# Patient Record
Sex: FEMALE | Race: WHITE | Employment: PART TIME | URBAN - METROPOLITAN AREA
[De-identification: names, ages, dates, MRNs, and addresses within clinical notes are randomized per-mention and may not be internally consistent; named-entity substitution may affect disease eponyms.]

---

## 2018-12-03 ENCOUNTER — HOSPITAL ENCOUNTER (EMERGENCY)
Facility: CLINIC | Age: 48
Discharge: HOME | End: 2018-12-03
Attending: PHYSICIAN ASSISTANT | Admitting: PHYSICIAN ASSISTANT
Payer: COMMERCIAL

## 2018-12-03 VITALS
OXYGEN SATURATION: 99 % | HEART RATE: 70 BPM | RESPIRATION RATE: 20 BRPM | WEIGHT: 150 LBS | DIASTOLIC BLOOD PRESSURE: 77 MMHG | TEMPERATURE: 98.2 F | SYSTOLIC BLOOD PRESSURE: 116 MMHG
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
## 2018-12-03 DIAGNOSIS — T18.128A ESOPHAGEAL OBSTRUCTION DUE TO FOOD IMPACTION: ICD-10-CM

## 2018-12-03 PROCEDURE — 96375 TX/PRO/DX INJ NEW DRUG ADDON: CPT

## 2018-12-03 PROCEDURE — 96374 THER/PROPH/DIAG INJ IV PUSH: CPT

## 2018-12-03 PROCEDURE — 25000128 H RX IP 250 OP 636: Performed by: PHYSICIAN ASSISTANT

## 2018-12-03 PROCEDURE — 99285 EMERGENCY DEPT VISIT HI MDM: CPT | Mod: 25

## 2018-12-03 PROCEDURE — 25500045 ZZH RX 255: Performed by: PHYSICIAN ASSISTANT

## 2018-12-03 RX ORDER — DIAZEPAM 10 MG/2ML
1 INJECTION, SOLUTION INTRAMUSCULAR; INTRAVENOUS ONCE
Status: COMPLETED | OUTPATIENT
Start: 2018-12-03 | End: 2018-12-03

## 2018-12-03 RX ORDER — DIAZEPAM 10 MG/2ML
1 INJECTION, SOLUTION INTRAMUSCULAR; INTRAVENOUS EVERY 4 HOURS PRN
Status: DISCONTINUED | OUTPATIENT
Start: 2018-12-03 | End: 2018-12-03

## 2018-12-03 RX ADMIN — GLUCAGON HYDROCHLORIDE 1 MG: KIT at 10:59

## 2018-12-03 RX ADMIN — Medication 1 MG: at 10:59

## 2018-12-03 RX ADMIN — ANTACID/ANTIFLATULENT 4 G: 380; 550; 10; 10 GRANULE, EFFERVESCENT ORAL at 11:11

## 2018-12-03 ASSESSMENT — ENCOUNTER SYMPTOMS
VOMITING: 0
VOICE CHANGE: 0
FEVER: 0
TROUBLE SWALLOWING: 1
CHILLS: 0
SHORTNESS OF BREATH: 0
NAUSEA: 0

## 2018-12-03 NOTE — ED AVS SNAPSHOT
[facility]    [address]    [facility] [address]    Phone:  [phone]    Fax:  [phone]                                       Julia Jefferson   MRN: [id number]    Department:  [facility]   Date of Visit:  12/3/2018           Patient Information     Date Of Birth          1970        Your diagnoses for this visit were:     Esophageal obstruction due to food impaction Resolved       You were seen by Stephanie Cummins PA-C.      Follow-up Information     Schedule an appointment as soon as possible for a visit with Primary Care Provider.        Follow up with [facility].    Specialty:  EMERGENCY MEDICINE    Why:  As needed if symptoms recur    Contact information:    [address]  [facility] [address] [phone]        Discharge Instructions         Follow up with your primary care provider as soon as possible to discuss scheduling an outpatient upper endoscopy.     Esophageal Blockage, Resolved  The esophagus is the passage that carries food from the mouth to the stomach. You had a blockage in the esophagus. This can happen after swallowing a large piece of food, taking a large pill, or swallowing foreign objects.  If this is a recurring problem, it can be a sign of disease in the esophagus, such as inflammation (swelling and irritation) or scarring. If you did not have a special procedure (endoscopy) today to treat your condition, further testing will be needed to evaluate this problem.  The blockage has cleared. You should be able to swallow normally again.  Home care    For the next 24 hours you may drink liquids and eat soft foods.    You may have been given medicine today to prevent pain and help you relax. If so, you may feel drowsy for the next 4 to 12 hours. Do not drive or operate dangerous equipment until you feel alert again.    If your esophagus was blocked  by food, be sure to cut solid food into small pieces before putting it into your mouth. Chew all foods well before swallowing.    If your esophagus was blocked by an over-the-counter pill (such as a vitamin), avoid this size pill in the future. If it was blocked by a prescription medicine, ask your healthcare provider for another form of medicine.  Follow-up care  Follow up with your healthcare provider, or as advised. If you continue to have problems, contact your doctor or this facility for advice. If this is a recurring problem, talk with your healthcare provider about it. He or she may suggest having an endoscopy. This is a look in the esophagus with a small camera and light in a narrow, flexible tube.  When to seek medical advice  Call your healthcare provider right away if any of these occur:    Unable to swallow    Significant pain on swallowing    Fever of 100.4 F (38 C) or higher, or as directed by your healthcare provider  Call 911  Call 911 if any of the following occur:     Chest pain or shortness of breath    Vomiting blood (red or black)    Blood in your stool (dark red or black color)   Date Last Reviewed: 5/1/2017 2000-2018 [employer]. [address] [place] [address]. All rights reserved. This information is not intended as a substitute for professional medical care. Always follow your healthcare professional's instructions.          24 Hour Appointment Hotline       To make an appointment at any [facility], call [phone] ([phone]). If you don't have a family doctor or clinic, we will help you find one. [facility] clinics are conveniently located to serve the needs of you and your family.             Review of your medicines      Notice     You have not been prescribed any medications.            Orders Needing Specimen Collection     None      Pending Results     No orders found from 12/1/2018 to 12/4/2018.            Pending Culture Results     No orders  found from 12/1/2018 to 12/4/2018.            Pending Results Instructions     If you had any lab results that were not finalized at the time of your Discharge, you can call the ED Lab Result RN at [phone]. You will be contacted by this team for any positive Lab results or changes in treatment. The nurses are available 7 days a week from 10A to 6:30P.  You can leave a message 24 hours per day and they will return your call.        Test Results From Your Hospital Stay               Clinical Quality Measure: Blood Pressure Screening     Your blood pressure was checked while you were in the emergency department today. The last reading we obtained was  BP: 132/79 . Please read the guidelines below about what these numbers mean and what you should do about them.  If your systolic blood pressure (the top number) is less than 120 and your diastolic blood pressure (the bottom number) is less than 80, then your blood pressure is normal. There is nothing more that you need to do about it.  If your systolic blood pressure (the top number) is 120-139 or your diastolic blood pressure (the bottom number) is 80-89, your blood pressure may be higher than it should be. You should have your blood pressure rechecked within a year by a primary care provider.  If your systolic blood pressure (the top number) is 140 or greater or your diastolic blood pressure (the bottom number) is 90 or greater, you may have high blood pressure. High blood pressure is treatable, but if left untreated over time it can put you at risk for heart attack, stroke, or kidney failure. You should have your blood pressure rechecked by a primary care provider within the next 4 weeks.  If your provider in the emergency department today gave you specific instructions to follow-up with your doctor or provider even sooner than that, you should follow that instruction and not wait for up to 4 weeks for your follow-up visit.        Thank you for choosing [facility]   "     Thank you for choosing [facility] for your care. Our goal is always to provide you with excellent care. Hearing back from our patients is one way we can continue to improve our services. Please take a few minutes to complete the written survey that you may receive in the mail after you visit with us. Thank you!        [facility] Information     [facility] lets you send messages to your doctor, view your test results, renew your prescriptions, schedule appointments and more. To sign up, go to [email or web address] . Click on \"Log in\" on the left side of the screen, which will take you to the Welcome page. Then click on \"Sign up Now\" on the right side of the page.     You will be asked to enter the access code listed below, as well as some personal information. Please follow the directions to create your username and password.     Your access code is: [email or web address]  Expires: 3/3/2019 11:52 AM     Your access code will  in 90 days. If you need help or a new code, please call your [facility] clinic or [phone].        Care EveryWhere ID     This is your Care EveryWhere ID. This could be used by other organizations to access your [facility] medical records  [id number]        Equal Access to Services     CAPO GUARDADO : Ana centenoo Sojose cruz, waaxda luqadaha, qaybta kaalmada adebrooklynnyada, tod jimenes. So [facility] [phone].    ATENCIÓN: Si habla español, tiene a cruz disposición servicios gratuitos de asistencia lingüística. Llame al [phone].    We comply with applicable federal civil rights laws and [place] laws. We do not discriminate on the basis of race, color, national origin, age, disability, sex, sexual orientation, or gender identity.            After Visit Summary       This is your record. Keep this with you and show to your community pharmacist(s) and doctor(s) at your next visit.                  "

## 2018-12-03 NOTE — ED AVS SNAPSHOT
[facility]    [address]    [facility] [address]    Phone:  [phone]    Fax:  [phone]                                       Julia Jefferson   MRN: [id number]    Department:  [facility]   Date of Visit:  12/3/2018           After Visit Summary Signature Page     I have received my discharge instructions, and my questions have been answered. I have discussed any challenges I see with this plan with the nurse or doctor.    ..........................................................................................................................................  Patient/Patient Representative Signature      ..........................................................................................................................................  Patient Representative Print Name and Relationship to Patient    ..................................................               ................................................  Date                                   Time    ..........................................................................................................................................  Reviewed by Signature/Title    ...................................................              ..............................................  Date                                               Time          22EPIC Rev 08/18

## 2018-12-03 NOTE — ED TRIAGE NOTES
Pt has had a piece of chicken stuck in throat since last night. Difficulty swallowing spit. Has had in past, but passed on its own. Has never needed a scope. Alert and oriented. ABC intact.

## 2018-12-03 NOTE — DISCHARGE INSTRUCTIONS
Follow up with your primary care provider as soon as possible to discuss scheduling an outpatient upper endoscopy.     Esophageal Blockage, Resolved  The esophagus is the passage that carries food from the mouth to the stomach. You had a blockage in the esophagus. This can happen after swallowing a large piece of food, taking a large pill, or swallowing foreign objects.  If this is a recurring problem, it can be a sign of disease in the esophagus, such as inflammation (swelling and irritation) or scarring. If you did not have a special procedure (endoscopy) today to treat your condition, further testing will be needed to evaluate this problem.  The blockage has cleared. You should be able to swallow normally again.  Home care    For the next 24 hours you may drink liquids and eat soft foods.    You may have been given medicine today to prevent pain and help you relax. If so, you may feel drowsy for the next 4 to 12 hours. Do not drive or operate dangerous equipment until you feel alert again.    If your esophagus was blocked by food, be sure to cut solid food into small pieces before putting it into your mouth. Chew all foods well before swallowing.    If your esophagus was blocked by an over-the-counter pill (such as a vitamin), avoid this size pill in the future. If it was blocked by a prescription medicine, ask your healthcare provider for another form of medicine.  Follow-up care  Follow up with your healthcare provider, or as advised. If you continue to have problems, contact your doctor or this facility for advice. If this is a recurring problem, talk with your healthcare provider about it. He or she may suggest having an endoscopy. This is a look in the esophagus with a small camera and light in a narrow, flexible tube.  When to seek medical advice  Call your healthcare provider right away if any of these occur:    Unable to swallow    Significant pain on swallowing    Fever of 100.4 F (38 C) or higher, or  as directed by your healthcare provider  Call 911  Call 911 if any of the following occur:     Chest pain or shortness of breath    Vomiting blood (red or black)    Blood in your stool (dark red or black color)   Date Last Reviewed: 5/1/2017 2000-2018 [employer]. [address] [place] [address]. All rights reserved. This information is not intended as a substitute for professional medical care. Always follow your healthcare professional's instructions.

## 2018-12-03 NOTE — ED PROVIDER NOTES
History     Chief Complaint:  Foreign Body in Throat    HPI   Julia Jefferson is a previously healthy 48 year old female who presents to the emergency department for evaluation of foreign body in her throat. She reports that she has felt like a piece of chicken is stuck in her throat since last night. She reports a history of this, but in the past, she was always able to pass the boluses on her own using water. She reports she has tried to both drink sips of water and chugging liquid, neither of which have worked. States she just vomits/regurgitates it back up a few minutes after drinking it. She reports that she cannot swallow even her saliva. She denies any history of endoscopy. She also denies any associated chills, fever, difficulty breathing, or other symptoms.     Allergies:  NKDA    Medications:    The patient is currently on no regular medications.    Past Medical History:    The patient denies any significant past medical history.    Past Surgical History:    The patient does not have any pertinent past surgical history.    Family History:    No past pertinent family history.    Social History:  Marital Status:   [2]  Negative for alcohol use.  Negative for tobacco use.     Review of Systems   Constitutional: Negative for chills and fever.   HENT: Positive for trouble swallowing. Negative for voice change.    Respiratory: Negative for shortness of breath.    Gastrointestinal: Negative for nausea and vomiting.   All other systems reviewed and are negative.      Physical Exam     Patient Vitals for the past 24 hrs:   BP Temp Temp src Pulse Resp SpO2 Weight   12/03/18 1041 - - - - - - 68 kg (150 lb)   12/03/18 1036 132/79 98.2  F (36.8  C) Oral 70 20 99 % -     Physical Exam  Constitutional: well appearing, sitting comfortably on bed.   Head: No external signs of trauma noted to head or face.   Eyes:  Conjunctiva normal.  ENT: MMM. Oropharynx without tonsillar enlargement or exudate. Normal voice.    Neck: Normal ROM.   Cardiovascular: Normal rate, regular rhythm, and intact distal pulses.    Respiratory: Effort normal. No respiratory distress. Lungs clear to auscultation bilaterally. No stridor.   GI: Soft. There is no tenderness. There is no rebound.   Musculoskeletal: No deformities appreciated. Normal ROM. No edema noted.  Neurological: Alert and Oriented x 3. Speech normal. Moves all extremities equally.   Psychiatric: Appropriate mood, affect, and behavior.   Skin: Skin is warm and dry.       Emergency Department Course   Interventions:  1059 Glucagon, 1 mg, IV injection   Diazepam, 1 mg, IV injection  1111 EZ Gas, 4 g, PO    Emergency Department Course:  Nursing notes and vitals reviewed. (1037) I performed an exam of the patient as documented above.      IV inserted. Medicine administered as documented above.      (1141) I rechecked the patient and discussed the results of her workup thus far. She was able to tolerate the EZ gas and the bolus has gone down. She continued to drink the soda with ease.      Findings and plan explained to the Patient. Patient discharged home with instructions regarding supportive care, medications, and reasons to return. The importance of close follow-up was reviewed.      I personally answered all related questions prior to discharge.      Impression & Plan      Medical Decision Makin year old female presenting with chicken stuck in her esophagus and unable to swallow. She reports similar episodes in the past that she has been able to resolve on her own by drinking a large amount of fluid, but this has failed this time. She was given EZ gas, glucagon, and valium in the ED which she tolerated well and afterwards reports the food bolus had passed down and she was able to tolerate PO without difficulty. She was observed briefly afterward and had no vomiting and was able to continue to tolerate PO. I discussed with her that the cause of this could be an esophageal  stricture, esophageal mass, or other esophageal disorder and that she requires outpatient follow up with GI for upper endoscopy. I offered to place an outpatient order for the procedure, but she declined and wished to follow-up with and get a referral from her PCP. She will follow-up with PCP as soon as possible. Instructed to return to the ED if symptoms returned.     Diagnosis:    ICD-10-CM    1. Esophageal obstruction due to food impaction K22.2     T18.128A     Resolved       Disposition:  discharged to home    Scribe Disclosure:  I, Jennifer Alves, am serving as a scribe on 12/3/2018 at 10:37 AM to personally document services performed by Stephanie Cummins PA-C based on my observations and the provider's statements to me.     Jennifer Alves  12/3/2018   [facility] EMERGENCY DEPARTMENT       Stephanie Cummins PA-C  12/03/18 1226

## 2021-09-09 ENCOUNTER — TELEPHONE (OUTPATIENT)
Dept: FAMILY MEDICINE | Facility: CLINIC | Age: 51
End: 2021-09-09

## 2021-09-09 NOTE — TELEPHONE ENCOUNTER
Reason for Call:  Form, our goal is to have forms completed with 72 hours, however, some forms may require a visit or additional information.    Type of letter, form or note:  FMLA    Who is the form from?: Patient    Where did the form come from: Patient or family brought in       What clinic location was the form placed at?: [facility]     Where the form was placed: Dr Ruiz  Box/Folder    What number is listed as a contact on the form?: none        Additional comments: patient dropped off forms for provider to fill out, due to having to watch and take care of her mother Emilie Webster, who sees Dr Ruiz. Patient herself has never seen provider here at [facility], to put this under    -not sure if patient needs to schedule appointment to discuss this or not, or if the mother who she will be taking care of needs an appointment     Please follow up with either if one is needed, otherwise paperwork is in [place] folder for time being.     Call taken on 9/9/2021 at 2:04 PM by Yasmine rGay

## 2025-03-23 ENCOUNTER — HOSPITAL ENCOUNTER (INPATIENT)
Facility: CLINIC | Age: 55
LOS: 1 days | Discharge: HOME | End: 2025-03-24
Attending: EMERGENCY MEDICINE | Admitting: INTERNAL MEDICINE
Payer: COMMERCIAL

## 2025-03-23 DIAGNOSIS — K80.00 CALCULUS OF GALLBLADDER WITH ACUTE CHOLECYSTITIS WITHOUT OBSTRUCTION: ICD-10-CM

## 2025-03-23 DIAGNOSIS — I72.8 SPLENIC ARTERY ANEURYSM: ICD-10-CM

## 2025-03-23 LAB
BASOPHILS # BLD AUTO: 0.1 10E3/UL (ref 0–0.2)
BASOPHILS NFR BLD AUTO: 0 %
EOSINOPHIL # BLD AUTO: 0.2 10E3/UL (ref 0–0.7)
EOSINOPHIL NFR BLD AUTO: 2 %
ERYTHROCYTE [DISTWIDTH] IN BLOOD BY AUTOMATED COUNT: 13.3 % (ref 10–15)
HCT VFR BLD AUTO: 36.8 % (ref 35–47)
HGB BLD-MCNC: 12.7 G/DL (ref 11.7–15.7)
IMM GRANULOCYTES # BLD: 0.1 10E3/UL
IMM GRANULOCYTES NFR BLD: 1 %
LYMPHOCYTES # BLD AUTO: 1 10E3/UL (ref 0.8–5.3)
LYMPHOCYTES NFR BLD AUTO: 9 %
MCH RBC QN AUTO: 30.8 PG (ref 26.5–33)
MCHC RBC AUTO-ENTMCNC: 34.5 G/DL (ref 31.5–36.5)
MCV RBC AUTO: 89 FL (ref 78–100)
MONOCYTES # BLD AUTO: 0.3 10E3/UL (ref 0–1.3)
MONOCYTES NFR BLD AUTO: 3 %
NEUTROPHILS # BLD AUTO: 9.6 10E3/UL (ref 1.6–8.3)
NEUTROPHILS NFR BLD AUTO: 85 %
NRBC # BLD AUTO: 0 10E3/UL
NRBC BLD AUTO-RTO: 0 /100
PLATELET # BLD AUTO: 224 10E3/UL (ref 150–450)
RBC # BLD AUTO: 4.13 10E6/UL (ref 3.8–5.2)
WBC # BLD AUTO: 11.3 10E3/UL (ref 4–11)

## 2025-03-23 PROCEDURE — 36415 COLL VENOUS BLD VENIPUNCTURE: CPT | Performed by: EMERGENCY MEDICINE

## 2025-03-23 PROCEDURE — 80053 COMPREHEN METABOLIC PANEL: CPT | Performed by: EMERGENCY MEDICINE

## 2025-03-23 PROCEDURE — 99285 EMERGENCY DEPT VISIT HI MDM: CPT | Mod: 25

## 2025-03-23 PROCEDURE — 83690 ASSAY OF LIPASE: CPT | Performed by: EMERGENCY MEDICINE

## 2025-03-23 PROCEDURE — 93005 ELECTROCARDIOGRAM TRACING: CPT

## 2025-03-23 PROCEDURE — 83036 HEMOGLOBIN GLYCOSYLATED A1C: CPT | Performed by: INTERNAL MEDICINE

## 2025-03-23 PROCEDURE — 85025 COMPLETE CBC W/AUTO DIFF WBC: CPT | Performed by: EMERGENCY MEDICINE

## 2025-03-23 RX ORDER — ONDANSETRON 2 MG/ML
4 INJECTION INTRAMUSCULAR; INTRAVENOUS EVERY 30 MIN PRN
Status: DISCONTINUED | OUTPATIENT
Start: 2025-03-23 | End: 2025-03-24

## 2025-03-23 RX ORDER — HYDROMORPHONE HYDROCHLORIDE 1 MG/ML
0.5 INJECTION, SOLUTION INTRAMUSCULAR; INTRAVENOUS; SUBCUTANEOUS EVERY 30 MIN PRN
Status: COMPLETED | OUTPATIENT
Start: 2025-03-23 | End: 2025-03-24

## 2025-03-23 ASSESSMENT — COLUMBIA-SUICIDE SEVERITY RATING SCALE - C-SSRS
2. HAVE YOU ACTUALLY HAD ANY THOUGHTS OF KILLING YOURSELF IN THE PAST MONTH?: NO
1. IN THE PAST MONTH, HAVE YOU WISHED YOU WERE DEAD OR WISHED YOU COULD GO TO SLEEP AND NOT WAKE UP?: NO
6. HAVE YOU EVER DONE ANYTHING, STARTED TO DO ANYTHING, OR PREPARED TO DO ANYTHING TO END YOUR LIFE?: NO

## 2025-03-24 ENCOUNTER — ANESTHESIA EVENT (OUTPATIENT)
Dept: SURGERY | Facility: CLINIC | Age: 55
End: 2025-03-24
Payer: COMMERCIAL

## 2025-03-24 ENCOUNTER — TELEPHONE (OUTPATIENT)
Dept: OTHER | Facility: CLINIC | Age: 55
End: 2025-03-24

## 2025-03-24 ENCOUNTER — APPOINTMENT (OUTPATIENT)
Dept: CT IMAGING | Facility: CLINIC | Age: 55
End: 2025-03-24
Attending: EMERGENCY MEDICINE
Payer: COMMERCIAL

## 2025-03-24 ENCOUNTER — ANESTHESIA (OUTPATIENT)
Dept: SURGERY | Facility: CLINIC | Age: 55
End: 2025-03-24
Payer: COMMERCIAL

## 2025-03-24 ENCOUNTER — APPOINTMENT (OUTPATIENT)
Dept: ULTRASOUND IMAGING | Facility: CLINIC | Age: 55
End: 2025-03-24
Attending: EMERGENCY MEDICINE
Payer: COMMERCIAL

## 2025-03-24 VITALS
WEIGHT: 134.9 LBS | RESPIRATION RATE: 12 BRPM | DIASTOLIC BLOOD PRESSURE: 77 MMHG | HEIGHT: 66 IN | OXYGEN SATURATION: 93 % | TEMPERATURE: 99.3 F | BODY MASS INDEX: 21.68 KG/M2 | SYSTOLIC BLOOD PRESSURE: 111 MMHG | HEART RATE: 79 BPM

## 2025-03-24 PROBLEM — K80.00 CALCULUS OF GALLBLADDER WITH ACUTE CHOLECYSTITIS WITHOUT OBSTRUCTION: Status: ACTIVE | Noted: 2025-03-24

## 2025-03-24 PROBLEM — I72.8 SPLENIC ARTERY ANEURYSM: Status: ACTIVE | Noted: 2025-03-24

## 2025-03-24 LAB
ALBUMIN SERPL BCG-MCNC: 4.1 G/DL (ref 3.5–5.2)
ALBUMIN SERPL BCG-MCNC: 4.6 G/DL (ref 3.5–5.2)
ALBUMIN UR-MCNC: NEGATIVE MG/DL
ALP SERPL-CCNC: 69 U/L (ref 40–150)
ALP SERPL-CCNC: 76 U/L (ref 40–150)
ALT SERPL W P-5'-P-CCNC: 19 U/L (ref 0–50)
ALT SERPL W P-5'-P-CCNC: 20 U/L (ref 0–50)
ANION GAP SERPL CALCULATED.3IONS-SCNC: 11 MMOL/L (ref 7–15)
ANION GAP SERPL CALCULATED.3IONS-SCNC: 12 MMOL/L (ref 7–15)
APPEARANCE UR: CLEAR
AST SERPL W P-5'-P-CCNC: 17 U/L (ref 0–45)
AST SERPL W P-5'-P-CCNC: 18 U/L (ref 0–45)
ATRIAL RATE - MUSE: 67 BPM
BILIRUB SERPL-MCNC: 0.8 MG/DL
BILIRUB SERPL-MCNC: 0.9 MG/DL
BILIRUB UR QL STRIP: NEGATIVE
BUN SERPL-MCNC: 11.3 MG/DL (ref 6–20)
BUN SERPL-MCNC: 9 MG/DL (ref 6–20)
CALCIUM SERPL-MCNC: 10.5 MG/DL (ref 8.8–10.4)
CALCIUM SERPL-MCNC: 9.3 MG/DL (ref 8.8–10.4)
CHLORIDE SERPL-SCNC: 101 MMOL/L (ref 98–107)
CHLORIDE SERPL-SCNC: 106 MMOL/L (ref 98–107)
COLOR UR AUTO: ABNORMAL
CREAT SERPL-MCNC: 0.67 MG/DL (ref 0.51–0.95)
CREAT SERPL-MCNC: 0.86 MG/DL (ref 0.51–0.95)
DIASTOLIC BLOOD PRESSURE - MUSE: NORMAL MMHG
EGFRCR SERPLBLD CKD-EPI 2021: 79 ML/MIN/1.73M2
EGFRCR SERPLBLD CKD-EPI 2021: >90 ML/MIN/1.73M2
ERYTHROCYTE [DISTWIDTH] IN BLOOD BY AUTOMATED COUNT: 13.3 % (ref 10–15)
EST. AVERAGE GLUCOSE BLD GHB EST-MCNC: 88 MG/DL
GLUCOSE BLDC GLUCOMTR-MCNC: 131 MG/DL (ref 70–99)
GLUCOSE BLDC GLUCOMTR-MCNC: 161 MG/DL (ref 70–99)
GLUCOSE SERPL-MCNC: 138 MG/DL (ref 70–99)
GLUCOSE SERPL-MCNC: 168 MG/DL (ref 70–99)
GLUCOSE UR STRIP-MCNC: NEGATIVE MG/DL
HBA1C MFR BLD: 4.7 %
HCG UR QL: NEGATIVE
HCO3 SERPL-SCNC: 26 MMOL/L (ref 22–29)
HCO3 SERPL-SCNC: 27 MMOL/L (ref 22–29)
HCT VFR BLD AUTO: 34.6 % (ref 35–47)
HGB BLD-MCNC: 11.8 G/DL (ref 11.7–15.7)
HGB UR QL STRIP: ABNORMAL
HOLD SPECIMEN: NORMAL
HOLD SPECIMEN: NORMAL
INR PPP: 0.99 (ref 0.85–1.15)
INTERPRETATION ECG - MUSE: NORMAL
KETONES UR STRIP-MCNC: NEGATIVE MG/DL
LEUKOCYTE ESTERASE UR QL STRIP: ABNORMAL
LIPASE SERPL-CCNC: 22 U/L (ref 13–60)
LIPASE SERPL-CCNC: 29 U/L (ref 13–60)
MCH RBC QN AUTO: 30.7 PG (ref 26.5–33)
MCHC RBC AUTO-ENTMCNC: 34.1 G/DL (ref 31.5–36.5)
MCV RBC AUTO: 90 FL (ref 78–100)
MUCOUS THREADS #/AREA URNS LPF: PRESENT /LPF
NITRATE UR QL: NEGATIVE
P AXIS - MUSE: 20 DEGREES
PH UR STRIP: 5 [PH] (ref 5–7)
PLATELET # BLD AUTO: 220 10E3/UL (ref 150–450)
POTASSIUM SERPL-SCNC: 3.7 MMOL/L (ref 3.4–5.3)
POTASSIUM SERPL-SCNC: 3.8 MMOL/L (ref 3.4–5.3)
PR INTERVAL - MUSE: 178 MS
PROT SERPL-MCNC: 6.8 G/DL (ref 6.4–8.3)
PROT SERPL-MCNC: 7.4 G/DL (ref 6.4–8.3)
QRS DURATION - MUSE: 84 MS
QT - MUSE: 414 MS
QTC - MUSE: 437 MS
R AXIS - MUSE: 23 DEGREES
RBC # BLD AUTO: 3.84 10E6/UL (ref 3.8–5.2)
RBC URINE: 1 /HPF
SODIUM SERPL-SCNC: 139 MMOL/L (ref 135–145)
SODIUM SERPL-SCNC: 144 MMOL/L (ref 135–145)
SP GR UR STRIP: 1.02 (ref 1–1.03)
SQUAMOUS EPITHELIAL: <1 /HPF
SYSTOLIC BLOOD PRESSURE - MUSE: NORMAL MMHG
T AXIS - MUSE: 43 DEGREES
UROBILINOGEN UR STRIP-MCNC: NORMAL MG/DL
VENTRICULAR RATE- MUSE: 67 BPM
WBC # BLD AUTO: 10.8 10E3/UL (ref 4–11)
WBC URINE: 1 /HPF

## 2025-03-24 PROCEDURE — 250N000009 HC RX 250: Performed by: STUDENT IN AN ORGANIZED HEALTH CARE EDUCATION/TRAINING PROGRAM

## 2025-03-24 PROCEDURE — 83690 ASSAY OF LIPASE: CPT | Performed by: INTERNAL MEDICINE

## 2025-03-24 PROCEDURE — 120N000001 HC R&B MED SURG/OB

## 2025-03-24 PROCEDURE — 96374 THER/PROPH/DIAG INJ IV PUSH: CPT | Mod: 59

## 2025-03-24 PROCEDURE — 250N000011 HC RX IP 250 OP 636: Performed by: STUDENT IN AN ORGANIZED HEALTH CARE EDUCATION/TRAINING PROGRAM

## 2025-03-24 PROCEDURE — 76705 ECHO EXAM OF ABDOMEN: CPT

## 2025-03-24 PROCEDURE — 258N000003 HC RX IP 258 OP 636: Performed by: ANESTHESIOLOGY

## 2025-03-24 PROCEDURE — 80053 COMPREHEN METABOLIC PANEL: CPT | Performed by: INTERNAL MEDICINE

## 2025-03-24 PROCEDURE — 370N000017 HC ANESTHESIA TECHNICAL FEE, PER MIN: Performed by: STUDENT IN AN ORGANIZED HEALTH CARE EDUCATION/TRAINING PROGRAM

## 2025-03-24 PROCEDURE — 99222 1ST HOSP IP/OBS MODERATE 55: CPT | Mod: 57 | Performed by: STUDENT IN AN ORGANIZED HEALTH CARE EDUCATION/TRAINING PROGRAM

## 2025-03-24 PROCEDURE — 999N000141 HC STATISTIC PRE-PROCEDURE NURSING ASSESSMENT: Performed by: STUDENT IN AN ORGANIZED HEALTH CARE EDUCATION/TRAINING PROGRAM

## 2025-03-24 PROCEDURE — 96361 HYDRATE IV INFUSION ADD-ON: CPT

## 2025-03-24 PROCEDURE — 99207 PR APP CREDIT; MD BILLING SHARED VISIT: CPT | Performed by: PHYSICIAN ASSISTANT

## 2025-03-24 PROCEDURE — 82962 GLUCOSE BLOOD TEST: CPT

## 2025-03-24 PROCEDURE — 0DNU4ZZ RELEASE OMENTUM, PERCUTANEOUS ENDOSCOPIC APPROACH: ICD-10-PCS | Performed by: STUDENT IN AN ORGANIZED HEALTH CARE EDUCATION/TRAINING PROGRAM

## 2025-03-24 PROCEDURE — 96375 TX/PRO/DX INJ NEW DRUG ADDON: CPT

## 2025-03-24 PROCEDURE — 710N000009 HC RECOVERY PHASE 1, LEVEL 1, PER MIN: Performed by: STUDENT IN AN ORGANIZED HEALTH CARE EDUCATION/TRAINING PROGRAM

## 2025-03-24 PROCEDURE — 47562 LAPAROSCOPIC CHOLECYSTECTOMY: CPT | Mod: AS | Performed by: PHYSICIAN ASSISTANT

## 2025-03-24 PROCEDURE — 250N000025 HC SEVOFLURANE, PER MIN: Performed by: STUDENT IN AN ORGANIZED HEALTH CARE EDUCATION/TRAINING PROGRAM

## 2025-03-24 PROCEDURE — 258N000003 HC RX IP 258 OP 636: Performed by: NURSE ANESTHETIST, CERTIFIED REGISTERED

## 2025-03-24 PROCEDURE — 88304 TISSUE EXAM BY PATHOLOGIST: CPT | Mod: TC | Performed by: STUDENT IN AN ORGANIZED HEALTH CARE EDUCATION/TRAINING PROGRAM

## 2025-03-24 PROCEDURE — 250N000011 HC RX IP 250 OP 636: Performed by: EMERGENCY MEDICINE

## 2025-03-24 PROCEDURE — 96376 TX/PRO/DX INJ SAME DRUG ADON: CPT

## 2025-03-24 PROCEDURE — 36415 COLL VENOUS BLD VENIPUNCTURE: CPT | Performed by: INTERNAL MEDICINE

## 2025-03-24 PROCEDURE — 81001 URINALYSIS AUTO W/SCOPE: CPT | Performed by: EMERGENCY MEDICINE

## 2025-03-24 PROCEDURE — 0FT44ZZ RESECTION OF GALLBLADDER, PERCUTANEOUS ENDOSCOPIC APPROACH: ICD-10-PCS | Performed by: STUDENT IN AN ORGANIZED HEALTH CARE EDUCATION/TRAINING PROGRAM

## 2025-03-24 PROCEDURE — 99235 HOSP IP/OBS SAME DATE MOD 70: CPT | Performed by: INTERNAL MEDICINE

## 2025-03-24 PROCEDURE — 710N000012 HC RECOVERY PHASE 2, PER MINUTE: Performed by: STUDENT IN AN ORGANIZED HEALTH CARE EDUCATION/TRAINING PROGRAM

## 2025-03-24 PROCEDURE — 360N000076 HC SURGERY LEVEL 3, PER MIN: Performed by: STUDENT IN AN ORGANIZED HEALTH CARE EDUCATION/TRAINING PROGRAM

## 2025-03-24 PROCEDURE — 250N000011 HC RX IP 250 OP 636: Performed by: ANESTHESIOLOGY

## 2025-03-24 PROCEDURE — 258N000003 HC RX IP 258 OP 636: Performed by: INTERNAL MEDICINE

## 2025-03-24 PROCEDURE — 81025 URINE PREGNANCY TEST: CPT | Performed by: INTERNAL MEDICINE

## 2025-03-24 PROCEDURE — 250N000011 HC RX IP 250 OP 636: Performed by: INTERNAL MEDICINE

## 2025-03-24 PROCEDURE — 250N000011 HC RX IP 250 OP 636: Mod: JZ | Performed by: EMERGENCY MEDICINE

## 2025-03-24 PROCEDURE — 272N000001 HC OR GENERAL SUPPLY STERILE: Performed by: STUDENT IN AN ORGANIZED HEALTH CARE EDUCATION/TRAINING PROGRAM

## 2025-03-24 PROCEDURE — 85027 COMPLETE CBC AUTOMATED: CPT | Performed by: INTERNAL MEDICINE

## 2025-03-24 PROCEDURE — 258N000003 HC RX IP 258 OP 636: Performed by: EMERGENCY MEDICINE

## 2025-03-24 PROCEDURE — 74177 CT ABD & PELVIS W/CONTRAST: CPT

## 2025-03-24 PROCEDURE — 250N000009 HC RX 250: Performed by: NURSE ANESTHETIST, CERTIFIED REGISTERED

## 2025-03-24 PROCEDURE — 47562 LAPAROSCOPIC CHOLECYSTECTOMY: CPT | Performed by: STUDENT IN AN ORGANIZED HEALTH CARE EDUCATION/TRAINING PROGRAM

## 2025-03-24 PROCEDURE — 258N000001 HC RX 258: Performed by: STUDENT IN AN ORGANIZED HEALTH CARE EDUCATION/TRAINING PROGRAM

## 2025-03-24 PROCEDURE — 85610 PROTHROMBIN TIME: CPT | Performed by: INTERNAL MEDICINE

## 2025-03-24 PROCEDURE — 250N000011 HC RX IP 250 OP 636: Performed by: NURSE ANESTHETIST, CERTIFIED REGISTERED

## 2025-03-24 PROCEDURE — 250N000009 HC RX 250: Performed by: EMERGENCY MEDICINE

## 2025-03-24 RX ORDER — SODIUM CHLORIDE 9 MG/ML
INJECTION, SOLUTION INTRAVENOUS CONTINUOUS
Status: DISCONTINUED | OUTPATIENT
Start: 2025-03-24 | End: 2025-03-24 | Stop reason: HOSPADM

## 2025-03-24 RX ORDER — ONDANSETRON 2 MG/ML
INJECTION INTRAMUSCULAR; INTRAVENOUS PRN
Status: DISCONTINUED | OUTPATIENT
Start: 2025-03-24 | End: 2025-03-24

## 2025-03-24 RX ORDER — ONDANSETRON 4 MG/1
4 TABLET, ORALLY DISINTEGRATING ORAL EVERY 30 MIN PRN
Status: DISCONTINUED | OUTPATIENT
Start: 2025-03-24 | End: 2025-03-24 | Stop reason: HOSPADM

## 2025-03-24 RX ORDER — LABETALOL HYDROCHLORIDE 5 MG/ML
10 INJECTION, SOLUTION INTRAVENOUS
Status: DISCONTINUED | OUTPATIENT
Start: 2025-03-24 | End: 2025-03-24 | Stop reason: HOSPADM

## 2025-03-24 RX ORDER — DEXTROSE MONOHYDRATE 25 G/50ML
25-50 INJECTION, SOLUTION INTRAVENOUS
Status: DISCONTINUED | OUTPATIENT
Start: 2025-03-24 | End: 2025-03-24 | Stop reason: HOSPADM

## 2025-03-24 RX ORDER — NALOXONE HYDROCHLORIDE 0.4 MG/ML
0.2 INJECTION, SOLUTION INTRAMUSCULAR; INTRAVENOUS; SUBCUTANEOUS
Status: DISCONTINUED | OUTPATIENT
Start: 2025-03-24 | End: 2025-03-24 | Stop reason: HOSPADM

## 2025-03-24 RX ORDER — SODIUM CHLORIDE, SODIUM LACTATE, POTASSIUM CHLORIDE, CALCIUM CHLORIDE 600; 310; 30; 20 MG/100ML; MG/100ML; MG/100ML; MG/100ML
INJECTION, SOLUTION INTRAVENOUS CONTINUOUS
Status: DISCONTINUED | OUTPATIENT
Start: 2025-03-24 | End: 2025-03-24 | Stop reason: HOSPADM

## 2025-03-24 RX ORDER — NALOXONE HYDROCHLORIDE 0.4 MG/ML
0.4 INJECTION, SOLUTION INTRAMUSCULAR; INTRAVENOUS; SUBCUTANEOUS
Status: DISCONTINUED | OUTPATIENT
Start: 2025-03-24 | End: 2025-03-24 | Stop reason: HOSPADM

## 2025-03-24 RX ORDER — OXYCODONE HYDROCHLORIDE 5 MG/1
5 TABLET ORAL
Status: DISCONTINUED | OUTPATIENT
Start: 2025-03-24 | End: 2025-03-24 | Stop reason: HOSPADM

## 2025-03-24 RX ORDER — ONDANSETRON 2 MG/ML
4 INJECTION INTRAMUSCULAR; INTRAVENOUS EVERY 6 HOURS PRN
Status: DISCONTINUED | OUTPATIENT
Start: 2025-03-24 | End: 2025-03-24 | Stop reason: HOSPADM

## 2025-03-24 RX ORDER — HYDROMORPHONE HCL IN WATER/PF 6 MG/30 ML
0.2 PATIENT CONTROLLED ANALGESIA SYRINGE INTRAVENOUS EVERY 5 MIN PRN
Status: DISCONTINUED | OUTPATIENT
Start: 2025-03-24 | End: 2025-03-24 | Stop reason: HOSPADM

## 2025-03-24 RX ORDER — AMOXICILLIN 250 MG
2 CAPSULE ORAL 2 TIMES DAILY PRN
Status: DISCONTINUED | OUTPATIENT
Start: 2025-03-24 | End: 2025-03-24 | Stop reason: HOSPADM

## 2025-03-24 RX ORDER — LABETALOL 20 MG/4 ML (5 MG/ML) INTRAVENOUS SYRINGE
PRN
Status: DISCONTINUED | OUTPATIENT
Start: 2025-03-24 | End: 2025-03-24

## 2025-03-24 RX ORDER — ACETAMINOPHEN 325 MG/1
650 TABLET ORAL
Status: DISCONTINUED | OUTPATIENT
Start: 2025-03-24 | End: 2025-03-24 | Stop reason: HOSPADM

## 2025-03-24 RX ORDER — FENTANYL CITRATE 50 UG/ML
25 INJECTION, SOLUTION INTRAMUSCULAR; INTRAVENOUS EVERY 5 MIN PRN
Status: DISCONTINUED | OUTPATIENT
Start: 2025-03-24 | End: 2025-03-24 | Stop reason: HOSPADM

## 2025-03-24 RX ORDER — NALOXONE HYDROCHLORIDE 0.4 MG/ML
0.1 INJECTION, SOLUTION INTRAMUSCULAR; INTRAVENOUS; SUBCUTANEOUS
Status: DISCONTINUED | OUTPATIENT
Start: 2025-03-24 | End: 2025-03-24 | Stop reason: HOSPADM

## 2025-03-24 RX ORDER — CEFAZOLIN SODIUM/WATER 2 G/20 ML
2 SYRINGE (ML) INTRAVENOUS SEE ADMIN INSTRUCTIONS
Status: DISCONTINUED | OUTPATIENT
Start: 2025-03-24 | End: 2025-03-24 | Stop reason: HOSPADM

## 2025-03-24 RX ORDER — BUPIVACAINE HYDROCHLORIDE AND EPINEPHRINE 5; 5 MG/ML; UG/ML
INJECTION, SOLUTION PERINEURAL PRN
Status: DISCONTINUED | OUTPATIENT
Start: 2025-03-24 | End: 2025-03-24 | Stop reason: HOSPADM

## 2025-03-24 RX ORDER — PROPOFOL 10 MG/ML
INJECTION, EMULSION INTRAVENOUS PRN
Status: DISCONTINUED | OUTPATIENT
Start: 2025-03-24 | End: 2025-03-24

## 2025-03-24 RX ORDER — ERTAPENEM 1 G/1
1 INJECTION, POWDER, LYOPHILIZED, FOR SOLUTION INTRAMUSCULAR; INTRAVENOUS EVERY 24 HOURS
Status: DISCONTINUED | OUTPATIENT
Start: 2025-03-24 | End: 2025-03-24 | Stop reason: HOSPADM

## 2025-03-24 RX ORDER — LIDOCAINE 40 MG/G
CREAM TOPICAL
Status: DISCONTINUED | OUTPATIENT
Start: 2025-03-24 | End: 2025-03-24 | Stop reason: HOSPADM

## 2025-03-24 RX ORDER — NICOTINE POLACRILEX 4 MG
15-30 LOZENGE BUCCAL
Status: DISCONTINUED | OUTPATIENT
Start: 2025-03-24 | End: 2025-03-24 | Stop reason: HOSPADM

## 2025-03-24 RX ORDER — FENTANYL CITRATE 50 UG/ML
INJECTION, SOLUTION INTRAMUSCULAR; INTRAVENOUS PRN
Status: DISCONTINUED | OUTPATIENT
Start: 2025-03-24 | End: 2025-03-24

## 2025-03-24 RX ORDER — AMOXICILLIN 250 MG
1 CAPSULE ORAL 2 TIMES DAILY PRN
Status: DISCONTINUED | OUTPATIENT
Start: 2025-03-24 | End: 2025-03-24 | Stop reason: HOSPADM

## 2025-03-24 RX ORDER — ONDANSETRON 2 MG/ML
4 INJECTION INTRAMUSCULAR; INTRAVENOUS EVERY 30 MIN PRN
Status: DISCONTINUED | OUTPATIENT
Start: 2025-03-24 | End: 2025-03-24 | Stop reason: HOSPADM

## 2025-03-24 RX ORDER — PROCHLORPERAZINE MALEATE 5 MG/1
10 TABLET ORAL EVERY 6 HOURS PRN
Status: DISCONTINUED | OUTPATIENT
Start: 2025-03-24 | End: 2025-03-24 | Stop reason: HOSPADM

## 2025-03-24 RX ORDER — INDOCYANINE GREEN AND WATER 25 MG
2.5 KIT INJECTION ONCE
Status: COMPLETED | OUTPATIENT
Start: 2025-03-24 | End: 2025-03-24

## 2025-03-24 RX ORDER — KETOROLAC TROMETHAMINE 30 MG/ML
INJECTION, SOLUTION INTRAMUSCULAR; INTRAVENOUS PRN
Status: DISCONTINUED | OUTPATIENT
Start: 2025-03-24 | End: 2025-03-24

## 2025-03-24 RX ORDER — LIDOCAINE HYDROCHLORIDE 20 MG/ML
INJECTION, SOLUTION INFILTRATION; PERINEURAL PRN
Status: DISCONTINUED | OUTPATIENT
Start: 2025-03-24 | End: 2025-03-24

## 2025-03-24 RX ORDER — ONDANSETRON 4 MG/1
4 TABLET, ORALLY DISINTEGRATING ORAL EVERY 6 HOURS PRN
Status: DISCONTINUED | OUTPATIENT
Start: 2025-03-24 | End: 2025-03-24 | Stop reason: HOSPADM

## 2025-03-24 RX ORDER — FENTANYL CITRATE 50 UG/ML
50 INJECTION, SOLUTION INTRAMUSCULAR; INTRAVENOUS EVERY 10 MIN PRN
Status: DISCONTINUED | OUTPATIENT
Start: 2025-03-24 | End: 2025-03-24 | Stop reason: HOSPADM

## 2025-03-24 RX ORDER — DEXAMETHASONE SODIUM PHOSPHATE 4 MG/ML
4 INJECTION, SOLUTION INTRA-ARTICULAR; INTRALESIONAL; INTRAMUSCULAR; INTRAVENOUS; SOFT TISSUE
Status: DISCONTINUED | OUTPATIENT
Start: 2025-03-24 | End: 2025-03-24 | Stop reason: HOSPADM

## 2025-03-24 RX ORDER — HYDROMORPHONE HCL IN WATER/PF 6 MG/30 ML
0.4 PATIENT CONTROLLED ANALGESIA SYRINGE INTRAVENOUS EVERY 5 MIN PRN
Status: DISCONTINUED | OUTPATIENT
Start: 2025-03-24 | End: 2025-03-24 | Stop reason: HOSPADM

## 2025-03-24 RX ORDER — DEXAMETHASONE SODIUM PHOSPHATE 4 MG/ML
INJECTION, SOLUTION INTRA-ARTICULAR; INTRALESIONAL; INTRAMUSCULAR; INTRAVENOUS; SOFT TISSUE PRN
Status: DISCONTINUED | OUTPATIENT
Start: 2025-03-24 | End: 2025-03-24

## 2025-03-24 RX ORDER — OXYCODONE HYDROCHLORIDE 5 MG/1
5-10 TABLET ORAL EVERY 4 HOURS PRN
Qty: 6 TABLET | Refills: 0 | Status: SHIPPED | OUTPATIENT
Start: 2025-03-24

## 2025-03-24 RX ORDER — ACETAMINOPHEN 325 MG/1
975 TABLET ORAL
Status: DISCONTINUED | OUTPATIENT
Start: 2025-03-24 | End: 2025-03-24 | Stop reason: HOSPADM

## 2025-03-24 RX ORDER — IOPAMIDOL 755 MG/ML
120 INJECTION, SOLUTION INTRAVASCULAR ONCE
Status: COMPLETED | OUTPATIENT
Start: 2025-03-24 | End: 2025-03-24

## 2025-03-24 RX ORDER — ACETAMINOPHEN 325 MG/1
650 TABLET ORAL EVERY 4 HOURS PRN
Qty: 50 TABLET | Refills: 0 | Status: SHIPPED | OUTPATIENT
Start: 2025-03-24

## 2025-03-24 RX ORDER — HYDROMORPHONE HCL IN WATER/PF 6 MG/30 ML
0.4 PATIENT CONTROLLED ANALGESIA SYRINGE INTRAVENOUS
Status: DISCONTINUED | OUTPATIENT
Start: 2025-03-24 | End: 2025-03-24

## 2025-03-24 RX ORDER — IBUPROFEN 600 MG/1
600 TABLET, FILM COATED ORAL EVERY 6 HOURS PRN
Qty: 30 TABLET | Refills: 0 | Status: SHIPPED | OUTPATIENT
Start: 2025-03-24

## 2025-03-24 RX ORDER — FENTANYL CITRATE 50 UG/ML
50 INJECTION, SOLUTION INTRAMUSCULAR; INTRAVENOUS EVERY 5 MIN PRN
Status: DISCONTINUED | OUTPATIENT
Start: 2025-03-24 | End: 2025-03-24 | Stop reason: HOSPADM

## 2025-03-24 RX ORDER — CEFAZOLIN SODIUM/WATER 2 G/20 ML
2 SYRINGE (ML) INTRAVENOUS
Status: COMPLETED | OUTPATIENT
Start: 2025-03-24 | End: 2025-03-24

## 2025-03-24 RX ORDER — HYDROMORPHONE HCL IN WATER/PF 6 MG/30 ML
0.2 PATIENT CONTROLLED ANALGESIA SYRINGE INTRAVENOUS
Status: DISCONTINUED | OUTPATIENT
Start: 2025-03-24 | End: 2025-03-24 | Stop reason: HOSPADM

## 2025-03-24 RX ORDER — HYDROMORPHONE HYDROCHLORIDE 1 MG/ML
0.5 INJECTION, SOLUTION INTRAMUSCULAR; INTRAVENOUS; SUBCUTANEOUS
Status: DISCONTINUED | OUTPATIENT
Start: 2025-03-24 | End: 2025-03-24 | Stop reason: HOSPADM

## 2025-03-24 RX ORDER — AMOXICILLIN 250 MG
1-2 CAPSULE ORAL 2 TIMES DAILY
Qty: 30 TABLET | Refills: 0 | Status: SHIPPED | OUTPATIENT
Start: 2025-03-24

## 2025-03-24 RX ORDER — PROPOFOL 10 MG/ML
INJECTION, EMULSION INTRAVENOUS CONTINUOUS PRN
Status: DISCONTINUED | OUTPATIENT
Start: 2025-03-24 | End: 2025-03-24

## 2025-03-24 RX ADMIN — SODIUM CHLORIDE 60 ML: 9 INJECTION, SOLUTION INTRAVENOUS at 01:01

## 2025-03-24 RX ADMIN — LIDOCAINE HYDROCHLORIDE 50 MG: 20 INJECTION, SOLUTION INFILTRATION; PERINEURAL at 13:07

## 2025-03-24 RX ADMIN — ROCURONIUM BROMIDE 40 MG: 50 INJECTION, SOLUTION INTRAVENOUS at 13:07

## 2025-03-24 RX ADMIN — MIDAZOLAM 2 MG: 1 INJECTION INTRAMUSCULAR; INTRAVENOUS at 12:57

## 2025-03-24 RX ADMIN — ONDANSETRON 4 MG: 2 INJECTION, SOLUTION INTRAMUSCULAR; INTRAVENOUS at 00:04

## 2025-03-24 RX ADMIN — DEXAMETHASONE SODIUM PHOSPHATE 8 MG: 4 INJECTION, SOLUTION INTRA-ARTICULAR; INTRALESIONAL; INTRAMUSCULAR; INTRAVENOUS; SOFT TISSUE at 13:07

## 2025-03-24 RX ADMIN — ONDANSETRON 4 MG: 2 INJECTION, SOLUTION INTRAMUSCULAR; INTRAVENOUS at 06:16

## 2025-03-24 RX ADMIN — SODIUM CHLORIDE: 0.9 INJECTION, SOLUTION INTRAVENOUS at 04:22

## 2025-03-24 RX ADMIN — HYDROMORPHONE HYDROCHLORIDE 0.5 MG: 1 INJECTION, SOLUTION INTRAMUSCULAR; INTRAVENOUS; SUBCUTANEOUS at 00:00

## 2025-03-24 RX ADMIN — HYDROMORPHONE HYDROCHLORIDE 1 MG: 1 INJECTION, SOLUTION INTRAMUSCULAR; INTRAVENOUS; SUBCUTANEOUS at 13:34

## 2025-03-24 RX ADMIN — HYDROMORPHONE HYDROCHLORIDE 0.4 MG: 0.2 INJECTION, SOLUTION INTRAMUSCULAR; INTRAVENOUS; SUBCUTANEOUS at 03:42

## 2025-03-24 RX ADMIN — FENTANYL CITRATE 100 MCG: 50 INJECTION INTRAMUSCULAR; INTRAVENOUS at 13:07

## 2025-03-24 RX ADMIN — SODIUM CHLORIDE 1000 ML: 0.9 INJECTION, SOLUTION INTRAVENOUS at 00:04

## 2025-03-24 RX ADMIN — SODIUM CHLORIDE, SODIUM LACTATE, POTASSIUM CHLORIDE, AND CALCIUM CHLORIDE: .6; .31; .03; .02 INJECTION, SOLUTION INTRAVENOUS at 13:28

## 2025-03-24 RX ADMIN — HYDROMORPHONE HYDROCHLORIDE 0.5 MG: 1 INJECTION, SOLUTION INTRAMUSCULAR; INTRAVENOUS; SUBCUTANEOUS at 00:45

## 2025-03-24 RX ADMIN — ERTAPENEM SODIUM 1 G: 1 INJECTION, POWDER, LYOPHILIZED, FOR SOLUTION INTRAMUSCULAR; INTRAVENOUS at 01:48

## 2025-03-24 RX ADMIN — HYDROMORPHONE HYDROCHLORIDE 0.5 MG: 1 INJECTION, SOLUTION INTRAMUSCULAR; INTRAVENOUS; SUBCUTANEOUS at 07:44

## 2025-03-24 RX ADMIN — KETOROLAC TROMETHAMINE 30 MG: 30 INJECTION, SOLUTION INTRAMUSCULAR at 13:27

## 2025-03-24 RX ADMIN — ONDANSETRON 4 MG: 2 INJECTION INTRAMUSCULAR; INTRAVENOUS at 14:01

## 2025-03-24 RX ADMIN — PROPOFOL 200 MG: 10 INJECTION, EMULSION INTRAVENOUS at 13:07

## 2025-03-24 RX ADMIN — PROPOFOL 50 MCG/KG/MIN: 10 INJECTION, EMULSION INTRAVENOUS at 13:16

## 2025-03-24 RX ADMIN — INDOCYANINE GREEN AND WATER 2.5 MG: KIT at 11:49

## 2025-03-24 RX ADMIN — SODIUM CHLORIDE, SODIUM LACTATE, POTASSIUM CHLORIDE, AND CALCIUM CHLORIDE: .6; .31; .03; .02 INJECTION, SOLUTION INTRAVENOUS at 11:53

## 2025-03-24 RX ADMIN — LABETALOL 20 MG/4 ML (5 MG/ML) INTRAVENOUS SYRINGE 10 MG: at 13:46

## 2025-03-24 RX ADMIN — FENTANYL CITRATE 50 MCG: 50 INJECTION, SOLUTION INTRAMUSCULAR; INTRAVENOUS at 12:37

## 2025-03-24 RX ADMIN — HYDROMORPHONE HYDROCHLORIDE 0.5 MG: 1 INJECTION, SOLUTION INTRAMUSCULAR; INTRAVENOUS; SUBCUTANEOUS at 02:12

## 2025-03-24 RX ADMIN — IOPAMIDOL 80 ML: 755 INJECTION, SOLUTION INTRAVENOUS at 01:00

## 2025-03-24 RX ADMIN — HYDROMORPHONE HYDROCHLORIDE 0.5 MG: 1 INJECTION, SOLUTION INTRAMUSCULAR; INTRAVENOUS; SUBCUTANEOUS at 09:43

## 2025-03-24 RX ADMIN — PHENYLEPHRINE HYDROCHLORIDE 200 MCG: 10 INJECTION INTRAVENOUS at 13:07

## 2025-03-24 RX ADMIN — SUGAMMADEX 150 MG: 100 INJECTION, SOLUTION INTRAVENOUS at 14:01

## 2025-03-24 RX ADMIN — HYDROMORPHONE HYDROCHLORIDE 0.4 MG: 0.2 INJECTION, SOLUTION INTRAMUSCULAR; INTRAVENOUS; SUBCUTANEOUS at 05:43

## 2025-03-24 RX ADMIN — Medication 2 G: at 12:57

## 2025-03-24 RX ADMIN — LABETALOL 20 MG/4 ML (5 MG/ML) INTRAVENOUS SYRINGE 10 MG: at 13:54

## 2025-03-24 ASSESSMENT — ACTIVITIES OF DAILY LIVING (ADL)
ADLS_ACUITY_SCORE: 18
ADLS_ACUITY_SCORE: 18
ADLS_ACUITY_SCORE: 41
ADLS_ACUITY_SCORE: 41
ADLS_ACUITY_SCORE: 33
ADLS_ACUITY_SCORE: 41
ADLS_ACUITY_SCORE: 18
ADLS_ACUITY_SCORE: 18
ADLS_ACUITY_SCORE: 33
ADLS_ACUITY_SCORE: 33
ADLS_ACUITY_SCORE: 41
ADLS_ACUITY_SCORE: 33
ADLS_ACUITY_SCORE: 18

## 2025-03-24 ASSESSMENT — ENCOUNTER SYMPTOMS: DYSRHYTHMIAS: 0

## 2025-03-24 NOTE — DISCHARGE INSTRUCTIONS
HOME CARE FOLLOWING LAPAROSCOPIC CHOLECYSTECTOMY  DANIEL Stuart, LYNSEY Louise, CHRISTINA Garcia    INCISIONAL CARE:  Replace the bandage over your incisions DAILY until all drainage stops, or if more comfortable to have in place.  If present, leave the steri-strips (white paper tapes) in place for 14 days after surgery.  If Dermabond (a type of skin glue) is present, leave in place until it wears/flakes off (2-3 weeks).     BATHING:  OK to shower 48 hours after surgery.  Avoid baths for 1 week after surgery.  You may wash your hair at any time.  Gently pat your incision dry after bathing.  Do not apply lotions, creams, or ointments to incisions.    ACTIVITY:  Light Activity -- you may immediately be up and about as tolerated.  Walking is encouraged, increase as tolerated.  Driving/Light Work-- when comfortable and off narcotic pain medications.  Strenuous Work/Activity -- limit lifting to 20 pounds for 2 weeks.  Progressively increase with time.  Active Sports (running, biking, etc.) -- cautiously resume after 2 weeks.    DISCOMFORT:  Local anesthetic placed at surgery should provide relief for 4-8 hours.  Begin taking pain pills before discomfort is severe.  Take the pain medication with some food, when possible, to minimize side effects.  Intermittent use of ice packs may help during the first 1-3 weeks after surgery.  Expect gradual improvement.    Over-the-counter anti-inflammatory medications (i.e. Ibuprofen/Advil/Motrin or Naprosyn/Aleve) may be used per package instructions in addition to or while tapering off the narcotic pain medications to decrease swelling and sensitivity.  DO NOT TAKE these Anti-inflammatory medications if your primary physician has advised against doing so, or if you have acid reflux, ulcer, or bleeding disorder, or take blood-thinner medications.  Call your primary physician or the surgery office if you have medication questions.    After laparoscopic  cholecystectomy, you may have shoulder or upper back discomfort due to the gas used during surgery.  This is temporary and should resolve within 2-3 days.  Frequent short walks may help with this.  You may have decreased energy level for 1-2 weeks after surgery related to your recovery.    DIET:  Start with liquids and gradually increase diet as tolerated.  Drink plenty of fluids.  While taking pain medications, consider use of a stool softener, increase your fiber in your diet, or add a fiber supplement (like Metamucil, Citrucel) to help prevent constipation - a possible side effect of pain medications.  It is not uncommon to experience some bowel changes (loose stools or constipation) after surgery.  Your body has to adapt to you no longer having a gall bladder.  To help minimize this side effect, avoid fatty foods for 1-2 weeks after surgery.  You may then slowly increase the amount of fatty foods in your diet.      NAUSEA:  If nauseated from the anesthetic/pain meds; rest in bed, get up cautiously with assistance, and drink clear liquids (juice, tea, broth).    FOLLOW-UP AFTER SURGERY:  -Our office will contact you approximately 2-3 weeks after surgery to check on your progress and answer any questions you may have.  If you are doing well, you will not need to return for an office appointment.  If any concerns are identified over the phone, we will help you make an appointment to see a provider.    -If you have not received a phone call, have any questions or concerns, or would like to be seen, please call us at [phone].  We are located at: [address], [address]; [place] [address]    -CONTACT US IF THE FOLLOWING DEVELOPS:   1. A fever that is above 101     2. Increased redness, warmth, drainage, bleeding, or swelling.   3. Pain that is not relieved by rest/ice and your prescription.   4.  Increasing pain after 48 hours.   5. Drainage that is thick, cloudy, yellow, green or white.   6. Any other  questions or concerns.      FREQUENTLY ASKED QUESTIONS:    Q:  How should my incision look?    A:  Normally your incision will appear slightly swollen with light redness directly along the incision itself as it heals.  It may feel like a bump or ridge as the healing/scarring happens, and over time (3-4 months) this bump or ridge feeling should slowly go away.  In general, clear or pink watery drainage can be normal at first as your incision heals, but should decrease over time.    Q:  How do I know if my incision is infected?  A:  Look at your incision for signs of infection, like redness around the incision spreading to surrounding skin, or drainage of cloudy or foul-smelling drainage.  If you feel warm, check your temperature to see if you are running a fever.    **If any of these things occur, please notify the nurse at our office.  We may need you to come into the office for an incision check.      Q:  How do I take care of my incision?  A:  If you have a dressing in place - Starting the day after surgery, replace the dressing 1-2 times a day until there is no further drainage from the incision.  At that time, a dressing is no longer needed.  Try to minimize tape on the skin if irritation is occurring at the tape sites.  If you have significant irritation from tape on the skin, please call the office to discuss other method of dressing your incision.    Small pieces of tape called  steri-strips  may be present directly overlying your incision; these may be removed 10 days after surgery unless otherwise specified by your surgeon.  If these tapes start to loosen at the ends, you may trim them back until they fall off or are removed.    A:  If you had  Dermabond  tissue glue used as a dressing (this causes your incision to look shiny with a clear covering over it) - This type of dressing wears off with time and does not require more dressings over the top unless it is draining around the glue as it wears off.  Do  not apply ointments or lotions over the incisions until the glue has completely worn off.    Q:  There is a piece of tape or a sticky  lead  still on my skin.  Can I remove this?  A:  Sometimes the sticky  leads  used for monitoring during surgery or for evaluation in the emergency department are not all removed while you are in the hospital.  These sometimes have a tab or metal dot on them.  You can easily remove these on your own, like taking off a band-aid.  If there is a gel substance under the  lead , simply wipe/clean it off with a washcloth or paper towel.      Q:  What can I do to minimize constipation (very hard stools, or lack of stools)?  A:  Stay well hydrated.  Increase your dietary fiber intake or take a fiber supplement -with plenty of water.  Walk around frequently.  You may consider an over-the-counter stool-softener.  Your Pharmacist can assist you with choosing one that is stocked at your pharmacy.  Constipation is also one of the most common side effects of pain medication.  If you are using pain medication, be pro-active and try to PREVENT problems with constipation by taking the steps above BEFORE constipation becomes a problem.    Q:  What do I do if I need more pain medications?  A:  Call the office to receive refills.  Be aware that certain pain meds cannot be called into a pharmacy and actually require a paper prescription.  A change may be made in your pain med as you progress thru your recovery period or if you have side effects to certain meds.    --Pain meds are NOT refilled after 5pm on weekdays, and NOT AT ALL on the weekends, so please look ahead to prevent problems.      Q:  Why am I having a hard time sleeping now that I am at home?  A:  Many medications you receive while you are in the hospital can impact your sleep for a number of days after your surgery/hospitalization.  Decreased level of activity and naps during the day may also make sleeping at night difficult.  Try to  minimize day-time naps, and get up frequently during the day to walk around your home during your recovery time.  Sleep aides may be of some help, but are not recommended for long-term use.      Q:  I am having some back discomfort.  What should I do?  A:  This may be related to certain positioning that was required for your surgery, extended periods of time in bed, or other changes in your overall activity level.  You may try ice, heat, acetaminophen, or ibuprofen to treat this temporarily.  Note that many pain medications have acetaminophen in them and would state this on the prescription bottle.  Be sure not to exceed the maximum of 4000mg per day of acetaminophen.     **If the pain you are having does not resolve, is severe, or is a flare of back pain you have had on other occasions prior to surgery, please contact your primary physician for further recommendations or for an appointment to be examined at their office.    Q:  Why am I having headaches?  A:  Headaches can be caused by many things:  caffeine withdrawal, use of pain meds, dehydration, high blood pressure, lack of sleep, over-activity/exhaustion, flare-up of usual migraine headaches.  If you feel this is related to muscle tension (a band-like feeling around the head, or a pressure at the low-back of the head) you may try ice or heat to this area.  You may need to drink more fluids (try electrolyte drink like Gatorade), rest, or take your usual migraine medications.   **If your headaches do not resolve, worsen, are accompanied by other symptoms, or if your blood pressure is high, please call your primary physician for recommendation and/or examination.    Q:  I am unable to urinate.  What do I do?  A:  A small percentage of people can have difficulty urinating initially after surgery.  This includes being able to urinate only a very small amount at a time and feeling discomfort or pressure in the very low abdomen.  This is called  urinary retention ,  and is actually an urgent situation.  Proceed to your nearest Emergency department for evaluation (not an Urgent Care Center).  Sometimes the bladder does not work correctly after certain medications you receive during surgery, or related to certain procedures.  You may need to have a catheter placed until your bladder recovers.  When planning to go to an Emergency department, it may help to call the ER to let them know you are coming in for this problem after a surgery.  This may help you get in quicker to be evaluated.  **If you have symptoms of a urinary tract infection, please contact your primary physician for the proper evaluation and treatment.          If you have other questions, please call the office Monday thru Friday between 8am and 4:30pm to discuss with the nurse or physician assistant.  [phone]    There is a surgeon ON CALL on weekday evenings and over the weekend in case of urgent need only, and may be contacted at the same number.    If you are having an emergency, call 911 or proceed to your nearest emergency department.    You received Toradol, an IV form of Ibuprofen (Motrin) at 1:30pm.  Do not take any Ibuprofen products until 7:30pm.    Maximum acetaminophen (Tylenol) dose from all sources should not exceed 4 grams (4000 mg) per day.

## 2025-03-24 NOTE — ANESTHESIA PROCEDURE NOTES
Airway       Patient location during procedure: OR       Procedure Start/Stop Times: 3/24/2025 1:04 PM  Staff -        CRNA: Antonio Muñoz APRN CRNA       Performed By: CRNA  Consent for Airway        Urgency: elective  Indications and Patient Condition       Indications for airway management: glynn-procedural       Induction type:intravenous       Mask difficulty assessment: 1 - vent by mask    Final Airway Details       Final airway type: endotracheal airway       Successful airway: ETT - single and Oral  Endotracheal Airway Details        ETT size (mm): 7.0       Cuffed: yes       Successful intubation technique: direct laryngoscopy       DL Blade Type: Caro 2       Grade View of Cords: 2       Adjucts: stylet       Position: Right       Measured from: gums/teeth       Secured at (cm): 21       Bite block used: None    Post intubation assessment        Placement verified by: capnometry, equal breath sounds and chest rise        Number of attempts at approach: 1       Number of other approaches attempted: 0       Secured with: tape       Ease of procedure: easy       Dentition: Intact and Unchanged    Medication(s) Administered   Medication Administration Time: 3/24/2025 1:04 PM

## 2025-03-24 NOTE — CONSULTS
General Surgery Consultation    Julia Jefferson MRN# [id number]   Age: 55 year old YOB: 1970     Date of Admission:  3/23/2025    Reason for consult:            Abdominal pain, right upper quadrant       Requesting physician:            Pamela Sandoval PA-C                Assessment and Plan:   Assessment:   #acute cholecystitis   #incidental finding of splenic artery aneurysm     Her imaging, exam, and history are consistent with acute cholecystitis.     In addition, she also have a incidentally noted splenic artery aneurysm measuring 2.1 cm. I talked to Dr Jacob this morning of Vascular surgery at [facility] who reccommended outpatient follow-up with vascular surgery and no urgent intervention given that she is not at child bearing age and has no plans to have anymore children.       Plan:   I have offered the patient a laparoscopic cholecystectomy, today.    We have discussed the indication, alternatives, risks and expected recovery.  Specifically we have discussed incisions, scarring, postoperative infections, anesthesia, bleeding, blood transfusion, open conversion, common bile duct injury, injury to intra-abdominal organs, adhesions leading to bowel obstruction, retained common bile duct stone, bile leak, DVT, PE, hernia, post cholecystectomy diarrhea, recovery, postoperative dietary restrictions and physical limitations.  We have discussed the recommended interventions and treatments for these complications.  All questions have been answered to the best of my ability.    She elects to proceed with surgery.   Will plan to discharge to home after surgery today unless complications arise.              Chief Complaint:   Abdominal pain, right upper quadrant     History is obtained from the patient.         History of Present Illness:   Julia Jefferson is a 55 year old female who presented to the ED yesterday with acute onset of abdominal pain. The pain came on suddenly and was intense. The pain is  located in the RUQ. She had pain like this once before but it was milder and went away. The pain is not going away now. She continues to have pain this morning. She has never had abdominal surgery.           Past Medical History:   History reviewed. No pertinent past medical history.          Past Surgical History:   History reviewed. No pertinent surgical history.          Social History:     Social History     Tobacco Use    Smoking status: Former     Types: Cigarettes    Smokeless tobacco: Never   Substance Use Topics    Alcohol use: Not on file             Family History:   History reviewed. No pertinent family history.         Allergies:   No Known Allergies          Medications:     Current Facility-Administered Medications   Medication Dose Route Frequency Provider Last Rate Last Admin    ceFAZolin Sodium (ANCEF) injection 2 g  2 g Intravenous Pre-Op/Pre-procedure x 1 dose Hector Mccullough MD        ceFAZolin Sodium (ANCEF) injection 2 g  2 g Intravenous See Admin Instructions Hector Mccullough MD        [Auto Hold] glucose gel 15-30 g  15-30 g Oral Q15 Min PRN Ameya Shabazz MD        Or    [Auto Hold] dextrose 50 % injection 25-50 mL  25-50 mL Intravenous Q15 Min PRN Ameya Shabazz MD        Or    [Auto Hold] glucagon injection 1 mg  1 mg Subcutaneous Q15 Min PRN Ameya Shabazz MD        [Auto Hold] ertapenem (INVanz) 1 g vial to attach to  mL bag  1 g Intravenous Q24H Ameya Shabazz MD   1 g at 03/24/25 0148    [Auto Hold] HYDROmorphone (DILAUDID) injection 0.2 mg  0.2 mg Intravenous Q2H PRN Ameya Shabazz MD        [Auto Hold] HYDROmorphone (PF) (DILAUDID) injection 0.5 mg  0.5 mg Intravenous Q2H PRN Ameya Shabazz MD   0.5 mg at 03/24/25 0943    [Auto Hold] insulin aspart (NovoLOG) injection (RAPID ACTING)  0.5-2.5 Units Subcutaneous Q4H Ameya Shabazz MD        lactated ringers infusion   Intravenous Continuous MateNico root MD 10  mL/hr at 03/24/25 1153 New Bag at 03/24/25 1153    [Auto Hold] lidocaine (LMX4) cream   Topical Q1H PRN Ameya Shabazz MD        lidocaine (LMX4) cream   Topical Q1H PRN Nico Thurman MD        [Auto Hold] lidocaine 1 % 0.1-1 mL  0.1-1 mL Other Q1H PRN Ameya Shabazz MD        lidocaine 1 % 0.1-1 mL  0.1-1 mL Other Q1H PRN Nico Thurman MD        [Auto Hold] naloxone (NARCAN) injection 0.2 mg  0.2 mg Intravenous Q2 Min PRN Ameya Shabazz MD        Or    [Auto Hold] naloxone (NARCAN) injection 0.4 mg  0.4 mg Intravenous Q2 Min PRN Ameya Shabazz MD        Or    [Auto Hold] naloxone (NARCAN) injection 0.2 mg  0.2 mg Intramuscular Q2 Min PRN Ameya Shabazz MD        Or    [Auto Hold] naloxone (NARCAN) injection 0.4 mg  0.4 mg Intramuscular Q2 Min PRN Ameya Shabazz MD        [Auto Hold] ondansetron (ZOFRAN ODT) ODT tab 4 mg  4 mg Oral Q6H PRN Ameya Shabazz MD        Or    [Auto Hold] ondansetron (ZOFRAN) injection 4 mg  4 mg Intravenous Q6H PRN Ameya Shabazz MD   4 mg at 03/24/25 0616    [Auto Hold] prochlorperazine (COMPAZINE) injection 10 mg  10 mg Intravenous Q6H PRN Ameya Shabazz MD        Or    [Auto Hold] prochlorperazine (COMPAZINE) tablet 10 mg  10 mg Oral Q6H PRN Ameya Shabazz MD        [Auto Hold] senna-docusate (SENOKOT-S/PERICOLACE) 8.6-50 MG per tablet 1 tablet  1 tablet Oral BID PRN Ameya Shabazz MD        Or    [Auto Hold] senna-docusate (SENOKOT-S/PERICOLACE) 8.6-50 MG per tablet 2 tablet  2 tablet Oral BID PRN Ameya Shabazz MD        [Auto Hold] sodium chloride (PF) 0.9% PF flush 3 mL  3 mL Intracatheter Q8H Ameya Turner MD   3 mL at 03/24/25 0543    [Auto Hold] sodium chloride (PF) 0.9% PF flush 3 mL  3 mL Intracatheter q1 min michaeln Ameya Shabazz MD   3 mL at 03/24/25 0616    sodium chloride (PF) 0.9% PF flush 3 mL  3 mL Intracatheter Q8H CHARAN Thurman  "Nico Henriquez MD        sodium chloride (PF) 0.9% PF flush 3 mL  3 mL Intracatheter q1 min prn Nico Thurman MD        sodium chloride 0.9 % infusion   Intravenous Continuous Ameya Shabazz  mL/hr at 03/24/25 0422 New Bag at 03/24/25 0422     Current Facility-Administered Medications   Medication Dose Route Frequency Provider Last Rate Last Admin    ceFAZolin Sodium (ANCEF) injection 2 g  2 g Intravenous Pre-Op/Pre-procedure x 1 dose Hector Mccullough MD        ceFAZolin Sodium (ANCEF) injection 2 g  2 g Intravenous See Admin Instructions Hector Mccullough MD        [Auto Hold] ertapenem (INVanz) 1 g vial to attach to  mL bag  1 g Intravenous Q24H Ameya Shabazz MD   1 g at 03/24/25 0148    [Auto Hold] insulin aspart (NovoLOG) injection (RAPID ACTING)  0.5-2.5 Units Subcutaneous Q4H Ameya Shabazz MD        [Auto Hold] sodium chloride (PF) 0.9% PF flush 3 mL  3 mL Intracatheter Q8H CHARAN Ameya Shabazz MD   3 mL at 03/24/25 0543    sodium chloride (PF) 0.9% PF flush 3 mL  3 mL Intracatheter Q8H Nico Connelly MD                Review of Systems:   The 10 point review of systems is negative other than noted in the HPI.          Physical Exam:   BP (!) 152/85   Pulse 69   Temp 98.6  F (37  C) (Temporal)   Resp 16   Ht 1.676 m (5' 6\")   Wt 61.2 kg (134 lb 14.4 oz)   LMP 11/21/2018   SpO2 97%   BMI 21.77 kg/m    General - Well developed, well nourished female in no apparent distress  HEENT: no scleral icterus  Lungs: normal effort on RA   Heart: regular rate and rhythm  Abdomen: soft, not distended, tender to palpation in the RUQ otherwise not tender   Extremities: Warm without edema  Neurologic: nonfocal  Psychiatric: Mood and affect appropriate  Skin: Without lesions, rashes, or jaundice         Data:     WBC -   Lab Results   Component Value Date    WBC 10.8 03/24/2025       HgB -   Lab Results   Component Value Date    HGB " 11.8 03/24/2025       Plt-   Lab Results   Component Value Date     03/24/2025       Liver Function Studies -   Recent Labs   Lab Test 03/24/25  0520   PROTTOTAL 6.8   ALBUMIN 4.1   BILITOTAL 0.8   ALKPHOS 69   AST 18   ALT 20       Lipase-   Lab Results   Component Value Date    LIPASE 22 03/24/2025         Imaging:  All imaging studies reviewed by me.    Results for orders placed or performed during the hospital encounter of 03/23/25   CT Abdomen Pelvis w Contrast    Narrative    EXAM: CT ABDOMEN PELVIS W CONTRAST  LOCATION: [facility]  DATE: 3/24/2025    INDICATION: abd pain  COMPARISON: None.  TECHNIQUE: CT scan of the abdomen and pelvis was performed following injection of IV contrast. Multiplanar reformats were obtained. Dose reduction techniques were used.  CONTRAST: 80 mL Isovue 370    FINDINGS:   LOWER CHEST: Normal.    HEPATOBILIARY: Cholelithiasis in a distended gallbladder with mild gallbladder wall thickening and possible faint pericholecystic inflammation about the gallbladder neck. If pain is localized to the right upper quadrant, recommend right upper quadrant   ultrasound. Normal liver. No intrahepatic bile duct or CBD dilatation. No radiodense choledocholithiasis.    PANCREAS: Normal.    SPLEEN: Benign appearing nearly 2 cm splenic lesion in the anterior spleen, probably a hemangioma.    ADRENAL GLANDS: Normal.    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 No follow-up needed. Nonobstructing 3 mm calculus in the right lower pole.    BOWEL: Small hiatal hernia with mild circumferential wall thickening the lower esophagus, possibly from vomiting or GERD. No bowel distention. Normal appendix. Normal colon and rectum. No intraperitoneal fluid.    LYMPH NODES: Normal.    VASCULATURE: Two saccular type nonthrombosed splenic artery aneurysm; 2.1 cm from the proximal splenic artery and 1.5 cm in the distal  splenic artery near splenic clau.    PELVIC ORGANS: Left ovarian cyst measuring roughly 3.6 cm. Small locules of gas in the urinary bladder may be from straight catheterization, but can be correlated with urinalysis to exclude bladder infection which can produce similar findings.    MUSCULOSKELETAL: No acute or aggressive osseous abnormalities.      Impression    IMPRESSION:   1.  Possible acute calculus cholecystitis. Consider right upper quadrant ultrasound and is localized to the right upper quadrant.  2.  Several saccular nonthrombosed splenic artery aneurysm, the largest measuring 2.1 cm. Typically when measured over 2 cm endovascular coil embolization treatment should be considered. If there is available outside hospital imaging to assess the   chronicity/stability of this finding, that would be important data point. If not available, recommend referral to either vascular surgery or interventional radiology for possible treatment.  3.  Nearly 4 cm left ovarian cyst. Recommend follow-up pelvic ultrasound in 6-12 months.   US Abdomen Limited (RUQ)    Narrative    EXAM: US ABDOMEN LIMITED  LOCATION: [facility]  DATE: 3/24/2025    INDICATION: gallstones cholecystitis  COMPARISON: CT abdomen earlier today.  TECHNIQUE: Limited abdominal ultrasound.    FINDINGS:    GALLBLADDER: Gallbladder is distended, contains multiple stones, and has a mildly thickened wall. No significant pericholecystic fluid.    BILE DUCTS: No biliary dilatation. The common duct measures 5 mm.    LIVER: Normal parenchyma with smooth contour. No focal mass. The portal vein is patent with flow in the normal direction.    RIGHT KIDNEY: 1.8 cm circumscribed echogenic mass of upper pole cortex had fat density on prior CT and is consistent with benign angiomyolipoma. No hydronephrosis.    PANCREAS: The visualized portions are normal.    No ascites.      Impression    IMPRESSION:  1.  Cholelithiasis and findings concerning for  acute cholecystitis.  2.  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           Hector Mccullough MD

## 2025-03-24 NOTE — ED TRIAGE NOTES
Pt reports abd pain and bloating. Pt reports most of her pain is under her right rib cage. Pt reports nausea and vomiting.

## 2025-03-24 NOTE — ANESTHESIA PREPROCEDURE EVALUATION
Anesthesia Pre-Procedure Evaluation    Patient: Julia Jefferson   MRN: [id number] : 1970        Procedure : Procedure(s):  CHOLECYSTECTOMY, LAPAROSCOPIC          No past medical history on file.   No past surgical history on file.   No Known Allergies   Social History     Tobacco Use    Smoking status: Not on file    Smokeless tobacco: Not on file   Substance Use Topics    Alcohol use: Not on file      Wt Readings from Last 1 Encounters:   25 61.2 kg (134 lb 14.4 oz)        Anesthesia Evaluation   Pt has had prior anesthetic. Type: General.    No history of anesthetic complications       ROS/MED HX  ENT/Pulmonary:  - neg pulmonary ROS  (-) recent URI   Neurologic:  - neg neurologic ROS     Cardiovascular: Comment: Splenic artery aneurysms noted incidentally on CT - neg cardiovascular ROS  (-) arrhythmias and valvular problems/murmurs   METS/Exercise Tolerance:     Hematologic:       Musculoskeletal:       GI/Hepatic:     (+)          cholecystitis/cholelithiasis,       (-) GERD   Renal/Genitourinary:  - neg Renal ROS     Endo:       Psychiatric/Substance Use:       Infectious Disease:       Malignancy:       Other:            Physical Exam    Airway        Mallampati: II   TM distance: > 3 FB   Neck ROM: full   Mouth opening: > 3 cm    Respiratory Devices and Support         Dental       (+) Minor Abnormalities - some fillings, tiny chips      Cardiovascular   cardiovascular exam normal          Pulmonary   pulmonary exam normal                OUTSIDE LABS:  CBC:   Lab Results   Component Value Date    WBC 10.8 2025    WBC 11.3 (H) 2025    HGB 11.8 2025    HGB 12.7 2025    HCT 34.6 (L) 2025    HCT 36.8 2025     2025     2025     BMP:   Lab Results   Component Value Date     2025     2025    POTASSIUM 3.8 2025    POTASSIUM 3.7 2025    CHLORIDE 106 2025    CHLORIDE 101 2025    CO2 27 2025     CO2 26 03/23/2025    BUN 9.0 03/24/2025    BUN 11.3 03/23/2025    CR 0.67 03/24/2025    CR 0.86 03/23/2025     (H) 03/24/2025     (H) 03/24/2025     COAGS:   Lab Results   Component Value Date    INR 0.99 03/24/2025     POC:   Lab Results   Component Value Date    HCG Negative 03/24/2025     HEPATIC:   Lab Results   Component Value Date    ALBUMIN 4.1 03/24/2025    PROTTOTAL 6.8 03/24/2025    ALT 20 03/24/2025    AST 18 03/24/2025    ALKPHOS 69 03/24/2025    BILITOTAL 0.8 03/24/2025     OTHER:   Lab Results   Component Value Date    A1C 4.7 03/23/2025    SALLY 9.3 03/24/2025    LIPASE 22 03/24/2025       Anesthesia Plan    ASA Status:  2    NPO Status:  NPO Appropriate    Anesthesia Type: General.     - Airway: ETT   Induction: Intravenous.   Maintenance: Balanced.        Consents    Anesthesia Plan(s) and associated risks, benefits, and realistic alternatives discussed. Questions answered and patient/representative(s) expressed understanding.     - Discussed:     - Discussed with:  Patient            Postoperative Care    Pain management: IV analgesics, Oral pain medications, Multi-modal analgesia.   PONV prophylaxis: Ondansetron (or other 5HT-3), Dexamethasone or Solumedrol     Comments:               Harika Erwin MD    Clinically Significant Risk Factors Present on Admission

## 2025-03-24 NOTE — BRIEF OP NOTE
[facility]    Brief Operative Note    Pre-operative diagnosis: Calculus of gallbladder with acute cholecystitis without obstruction [K80.00]  Post-operative diagnosis Same as pre-operative diagnosis    Procedure: LAPAROSCOPIC CHOLECYSTECTOMY, N/A - Abdomen    Surgeon: Surgeons and Role:     * Hector Mccullough MD - Primary     * Heidy Sewell PA-C - Assisting  Anesthesia: General   Estimated Blood Loss: 50 mL from 3/24/2025  1:01 PM to 3/24/2025  2:13 PM      Drains: None  Specimens:   ID Type Source Tests Collected by Time Destination   1 : GALLBLADDER AND CONTENTS Tissue Gallbladder SURGICAL PATHOLOGY EXAM Hector Mccullough MD 3/24/2025  1:55 PM      Findings:   Acutely inflamed gallbladder .  Complications: None.  Implants: * No implants in log *      -ok to discharge home today

## 2025-03-24 NOTE — ED PROVIDER NOTES
"    History     Chief Complaint:  Abdominal Pain       HPI   Julia Jefferson is a 55 year old female band like upper abdominal pain.  No fever.  No CP or SOB.  No dysuria.  Had NV only po contents.  Started after dinner 7pm.  Ate at [employer] then home.  Indigestion.  Tried tums ice cream no relief.  No hx of abd surgeries.  No hx of stones.        Independent Historian:        Review of External Notes:  Obgyn notes over 2024 for menopause and post bleeding.      Medications:    No current outpatient medications on file.      Past Medical History:    No past medical history on file.    Past Surgical History:    No past surgical history on file.       Physical Exam   Patient Vitals for the past 24 hrs:   BP Temp Temp src Pulse Resp SpO2 Height Weight   03/23/25 2327 122/85 97.4  F (36.3  C) Temporal 77 18 98 % 1.676 m (5' 6\") 71.7 kg (158 lb 1.1 oz)        Physical Exam  General: Patient is well appearing. Squirming like gallstones or kidney stones.    Head: Atraumatic.  Eyes: Conjunctivae and EOM are normal. No scleral icterus.  Neck: Normal range of motion. Neck supple.   Cardiovascular: Normal rate, regular rhythm, normal heart sounds and intact distal pulses.   Pulmonary/Chest: Breath sounds normal. No respiratory distress.  Abdominal: Soft. Bowel sounds are normal. No distension. No tenderness. No rebound or guarding.   Musculoskeletal: Normal range of motion.  Skin: Warm and dry. No rash noted. Not diaphoretic.      Emergency Department Course   ECG  NSR HR 67 no acute injury pattern.      Imaging:  CT Abdomen Pelvis w Contrast   Final Result   IMPRESSION:    1.  Possible acute calculus cholecystitis. Consider right upper quadrant ultrasound and is localized to the right upper quadrant.   2.  Several saccular nonthrombosed splenic artery aneurysm, the largest measuring 2.1 cm. Typically when measured over 2 cm endovascular coil embolization treatment should be considered. If there is available outside " hospital imaging to assess the    chronicity/stability of this finding, that would be important data point. If not available, recommend referral to either vascular surgery or interventional radiology for possible treatment.   3.  Nearly 4 cm left ovarian cyst. Recommend follow-up pelvic ultrasound in 6-12 months.      US Abdomen Limited (RUQ)    (Results Pending)       Laboratory:  Labs Ordered and Resulted from Time of ED Arrival to Time of ED Departure   COMPREHENSIVE METABOLIC PANEL - Abnormal       Result Value    Sodium 139      Potassium 3.7      Carbon Dioxide (CO2) 26      Anion Gap 12      Urea Nitrogen 11.3      Creatinine 0.86      GFR Estimate 79      Calcium 10.5 (*)     Chloride 101      Glucose 168 (*)     Alkaline Phosphatase 76      AST 17      ALT 19      Protein Total 7.4      Albumin 4.6      Bilirubin Total 0.9     CBC WITH PLATELETS AND DIFFERENTIAL - Abnormal    WBC Count 11.3 (*)     RBC Count 4.13      Hemoglobin 12.7      Hematocrit 36.8      MCV 89      MCH 30.8      MCHC 34.5      RDW 13.3      Platelet Count 224      % Neutrophils 85      % Lymphocytes 9      % Monocytes 3      % Eosinophils 2      % Basophils 0      % Immature Granulocytes 1      NRBCs per 100 WBC 0      Absolute Neutrophils 9.6 (*)     Absolute Lymphocytes 1.0      Absolute Monocytes 0.3      Absolute Eosinophils 0.2      Absolute Basophils 0.1      Absolute Immature Granulocytes 0.1      Absolute NRBCs 0.0     ROUTINE UA WITH MICROSCOPIC REFLEX TO CULTURE - Abnormal    Color Urine Light Yellow      Appearance Urine Clear      Glucose Urine Negative      Bilirubin Urine Negative      Ketones Urine Negative      Specific Gravity Urine 1.020      Blood Urine Trace (*)     pH Urine 5.0      Protein Albumin Urine Negative      Urobilinogen Urine Normal      Nitrite Urine Negative      Leukocyte Esterase Urine Trace (*)     Mucus Urine Present (*)     RBC Urine 1      WBC Urine 1      Squamous Epithelials Urine <1      LIPASE - Normal    Lipase 29          Procedures       Emergency Department Course & Assessments:    Interventions:  Medications   ondansetron (ZOFRAN) injection 4 mg (4 mg Intravenous $Given 3/24/25 0004)   HYDROmorphone (PF) (DILAUDID) injection 0.5 mg (0.5 mg Intravenous $Given 3/24/25 0045)   ertapenem (INVanz) 1 g vial to attach to  mL bag (has no administration in time range)   sodium chloride 0.9% BOLUS 1,000 mL (1,000 mLs Intravenous $New Bag 3/24/25 0004)   iopamidol (ISOVUE-370) solution 120 mL (80 mLs Intravenous $Given 3/24/25 0100)   Saline CT scan flush (60 mLs Intravenous $Given 3/24/25 0101)        Assessments:      Independent Interpretation (X-rays, CTs, rhythm strip):  CT abd multiple GB stones distention borderline wall thickening.  No free fluid ro air.  Also no large masses.  Incidental splenic artery aneurysms.      Consultations/Discussion of Management or Tests:  Hospitalist       Social Drivers of Health affecting care:       Disposition:  Admit    Impression & Plan           Medical Decision Making:  Pt has pain better controlled but still symptomatic calculous acute lucina with slight elev WBC and Ct confirms.  RUQ US for inpt as well IV abx.  Could benefit form IR consult tomorrow for likely incidental splenic artery findings.      Diagnosis:    ICD-10-CM    1. Calculus of gallbladder with acute cholecystitis without obstruction  K80.00       2. Splenic artery aneurysm  I72.8     chronicity unknown           Discharge Medications:  New Prescriptions    No medications on file            3/23/2025   Avery Stevenson MD Stevens, Andrew C, MD  03/24/25 0142

## 2025-03-24 NOTE — PLAN OF CARE
ROOM # 206-1    Living Situation (if not independent, order SW consult): Home w/ spouse   Facility name:  : [facility]- [phone]    Activity level at baseline: Ind   Activity level on admit: Ind    Who will be transporting you at discharge:     Patient registered to observation; given Patient Bill of Rights; given the opportunity to ask questions about observation status and their plan of care.  Patient has been oriented to the observation room, bathroom and call light is in place.    Discussed discharge goals and expectations with patient/family.

## 2025-03-24 NOTE — OP NOTE
[facility] General Surgery Operative Note    Pre-operative diagnosis: acute cholecystitis   Post-operative diagnosis: same   Procedure: laparoscopic cholecystectomy   Surgeon: Hector Mccullough MD   Assistant(s): Heidy Sewell PA-C  The Physician Assistant was medically necessary for their expertise in prepping, camera management, suctioning, suturing and retraction.   Anesthesia: General and local anesthesia with 0.5% marcaine and epinephrine     Estimated blood loss:  Specimen: 50 cc  gallbladder and contents               INDICATION FOR OPERATION: This is a 55 year old female who presented to ED with abdominal pain. Studies including ultrasound and CT were consistent with acute cholecystitis. We discussed laparoscopic cholecystectomy and the patient agreed to proceed after hearing the risks and benefits.    DESCRIPTION OF PROCEDURE:  The patient was taken to the operating room and placed on the table in supine position.  General endotracheal anesthesia was induced and the abdomen was prepped and draped in standard sterile fashion.      A 10-mm port was placed in the supra-umbilical position using the Romi technique.  Following smooth establishment of a pneumoperitoneum, three 5-mm ports were placed under direct laparoscopic visualization, one subxiphoid and two right flank working ports.  The abdomen was briefly surveyed for pathology or injury during port placement; no pathology or injuries were seen.      The patient was then placed in reverse Trendelenburg and right side up.  The gallbladder appeared edematous, tensely distended, and with copious adhesions of omentum. The gallbladder was decompressed with the laparoscopic needle with return of green bilious fluid. The fundus of the gallbladder was grasped and retracted cephalad. Omental adhesions were taken down.  The infundibulum was grasped and retracted laterally.  The peritoneum over the medial and lateral aspects of the triangle of Calot was  taken down with the [place] dissector, suction  and modest amounts of Bovie electrocautery.  The cystic duct and artery were freed up from surrounding tissues.  The triangle of Calot was skeletonized revealing the critical view of safety. Intraoperative fluorescence was also used to visualize the course of the common bile duct and cystic duct with no other ductal structures seen.    The cystic artery and duct were each clipped twice proximally, once distally and transected with the scissors.  The gallbladder was then removed from the liver using the hook electrocautery.  There was small spillage of bile from the gallbladder during dissection which was suctioned out. The gallbladder was passed into an Endocatch bag and removed from the abdomen. We observed the right upper quadrant carefully for hemostasis. There was some moderate oozing from the gallbladder fossa which was made hemostatic with cautery and Nu-Knut. Hemostasis was now assured.  The abdomen was irrigated with saline.     The 5 mm laparoscopic ports were removed under direct visualization and were hemostatic. The Romi and port was then removed and the pneumoperitoneum was evacuated.  The fascia was reapproximated using an 0 Vicryl figure-of-eight stitches and we had to place a few of them as the incision had to be enlarged slightly to get the gallbladder out.  All port sites were then closed with absorbable intracuticular suture.  SteriStrips and sterile bandages were then applied.  The patient was then woken, extubated, and transported to the Postanesthesia Care Unit in satisfactory condition. Sponge and needle counts were correct on two counts at the conclusion of the procedure.     FINDINGS:   1.) acutely inflamed gallbladder with several large stones in the gallbladder     Hector Mccullough MD

## 2025-03-24 NOTE — ED NOTES
"[facility]  ED Nurse Handoff Report    ED Chief complaint: Abdominal Pain  . ED Diagnosis:   Final diagnoses:   None       Allergies: No Known Allergies    Code Status: Full Code    Activity level - Baseline/Home:  independent.  Activity Level - Current:   standby.   Lift room needed: No.   Bariatric: No   Needed: No   Isolation: No.   Infection: Not Applicable.     Respiratory status: Room air    Vital Signs (within 30 minutes):   Vitals:    03/23/25 2327   BP: 122/85   Pulse: 77   Resp: 18   Temp: 97.4  F (36.3  C)   TempSrc: Temporal   SpO2: 98%   Weight: 71.7 kg (158 lb 1.1 oz)   Height: 1.676 m (5' 6\")       Cardiac Rhythm:  ,      Pain level:    Patient confused: No.   Patient Falls Risk: nonskid shoes/slippers when out of bed, patient and family education, and activity supervised.   Elimination Status: Has voided     Patient Report - Initial Complaint: Abdominal pain.   Focused Assessment: Pt is a 55 year old female band like upper abdominal pain.  No fever.  No CP or SOB.  No dysuria.  Had NV only po contents.  Started after dinner 7pm.  Ate at [employer] then home.  Indigestion.  Tried tums ice cream no relief.  No hx of abd surgeries.  No hx of stones.       Abnormal Results:   Labs Ordered and Resulted from Time of ED Arrival to Time of ED Departure   COMPREHENSIVE METABOLIC PANEL - Abnormal       Result Value    Sodium 139      Potassium 3.7      Carbon Dioxide (CO2) 26      Anion Gap 12      Urea Nitrogen 11.3      Creatinine 0.86      GFR Estimate 79      Calcium 10.5 (*)     Chloride 101      Glucose 168 (*)     Alkaline Phosphatase 76      AST 17      ALT 19      Protein Total 7.4      Albumin 4.6      Bilirubin Total 0.9     CBC WITH PLATELETS AND DIFFERENTIAL - Abnormal    WBC Count 11.3 (*)     RBC Count 4.13      Hemoglobin 12.7      Hematocrit 36.8      MCV 89      MCH 30.8      MCHC 34.5      RDW 13.3      Platelet Count 224      % Neutrophils 85      % Lymphocytes 9  "     % Monocytes 3      % Eosinophils 2      % Basophils 0      % Immature Granulocytes 1      NRBCs per 100 WBC 0      Absolute Neutrophils 9.6 (*)     Absolute Lymphocytes 1.0      Absolute Monocytes 0.3      Absolute Eosinophils 0.2      Absolute Basophils 0.1      Absolute Immature Granulocytes 0.1      Absolute NRBCs 0.0     ROUTINE UA WITH MICROSCOPIC REFLEX TO CULTURE - Abnormal    Color Urine Light Yellow      Appearance Urine Clear      Glucose Urine Negative      Bilirubin Urine Negative      Ketones Urine Negative      Specific Gravity Urine 1.020      Blood Urine Trace (*)     pH Urine 5.0      Protein Albumin Urine Negative      Urobilinogen Urine Normal      Nitrite Urine Negative      Leukocyte Esterase Urine Trace (*)     Mucus Urine Present (*)     RBC Urine 1      WBC Urine 1      Squamous Epithelials Urine <1     LIPASE - Normal    Lipase 29          CT Abdomen Pelvis w Contrast   Final Result   IMPRESSION:    1.  Possible acute calculus cholecystitis. Consider right upper quadrant ultrasound and is localized to the right upper quadrant.   2.  Several saccular nonthrombosed splenic artery aneurysm, the largest measuring 2.1 cm. Typically when measured over 2 cm endovascular coil embolization treatment should be considered. If there is available outside hospital imaging to assess the    chronicity/stability of this finding, that would be important data point. If not available, recommend referral to either vascular surgery or interventional radiology for possible treatment.   3.  Nearly 4 cm left ovarian cyst. Recommend follow-up pelvic ultrasound in 6-12 months.      US Abdomen Limited (RUQ)    (Results Pending)       Treatments provided: see MAR  Family Comments: n/a  OBS brochure/video discussed/provided to patient:  N/A  ED Medications:   Medications   ondansetron (ZOFRAN) injection 4 mg (4 mg Intravenous $Given 3/24/25 0004)   HYDROmorphone (PF) (DILAUDID) injection 0.5 mg (0.5 mg Intravenous  $Given 3/24/25 0045)   sodium chloride 0.9% BOLUS 1,000 mL (1,000 mLs Intravenous $New Bag 3/24/25 0004)   iopamidol (ISOVUE-370) solution 120 mL (80 mLs Intravenous $Given 3/24/25 0100)   Saline CT scan flush (60 mLs Intravenous $Given 3/24/25 0101)       Drips infusing:  No  For the majority of the shift this patient was Green.   Interventions performed were n/a.    Sepsis treatment initiated: No    Cares/treatment/interventions/medications to be completed following ED care: follow orders    ED Nurse Name: Heidy Alexis RN  1:38 AM    RECEIVING UNIT ED HANDOFF REVIEW    Above ED Nurse Handoff Report was reviewed: Yes  Reviewed by: Harika Monte RN on March 24, 2025 at 3:31 AM

## 2025-03-24 NOTE — ANESTHESIA CARE TRANSFER NOTE
Patient: Julia Jefferson    Procedure: Procedure(s):  LAPAROSCOPIC CHOLECYSTECTOMY       Diagnosis: Calculus of gallbladder with acute cholecystitis without obstruction [K80.00]  Diagnosis Additional Information: No value filed.    Anesthesia Type:   General     Note:    Oropharynx: oral airway in place and spontaneously breathing  Level of Consciousness: drowsy  Oxygen Supplementation: face mask  Level of Supplemental Oxygen (L/min / FiO2): 6  Independent Airway: airway patency satisfactory and stable  Dentition: dentition unchanged  Vital Signs Stable: post-procedure vital signs reviewed and stable  Report to RN Given: handoff report given  Patient transferred to: PACU    Handoff Report: Identifed the Patient, Identified the Reponsible Provider, Reviewed the pertinent medical history, Discussed the surgical course, Reviewed Intra-OP anesthesia mangement and issues during anesthesia, Set expectations for post-procedure period and Allowed opportunity for questions and acknowledgement of understanding      Vitals:  Vitals Value Taken Time   BP     Temp     Pulse     Resp     SpO2 94 % 03/24/25 1415   Vitals shown include unfiled device data.    Electronically Signed By: SERAFIN Salvador CRNA  March 24, 2025  2:17 PM

## 2025-03-24 NOTE — PLAN OF CARE
"A&Ox4. C/o upper abdominal pain, PRN pain meds given (see MAR). Sent to procedural area in stable condition.   Will continue with plan of care.  Goal Outcome Evaluation:      Plan of Care Reviewed With: patient, spouse    Overall Patient Progress: no changeOverall Patient Progress: no change    Outcome Evaluation: A&Ox4. C/o upper abdominal pain, PRN pain meds given (see MAR). Sent to procedural area in stable condition.      Problem: Adult Inpatient Plan of Care  Goal: Plan of Care Review  Description: The Plan of Care Review/Shift note should be completed every shift.  The Outcome Evaluation is a brief statement about your assessment that the patient is improving, declining, or no change.  This information will be displayed automatically on your shiftnote.  Outcome: Progressing  Flowsheets (Taken 3/24/2025 1115)  Outcome Evaluation: A&Ox4. C/o upper abdominal pain, PRN pain meds given (see MAR). Sent to procedural area in stable condition.  Plan of Care Reviewed With:   patient   spouse  Overall Patient Progress: no change  Goal: Patient-Specific Goal (Individualized)  Description: You can add care plan individualizations to a care plan. Examples of Individualization might be:  \"Parent requests to be called daily at 9am for status\", \"I have a hard time hearing out of my right ear\", or \"Do not touch me to wake me up as it startlesme\".  Outcome: Progressing  Goal: Absence of Hospital-Acquired Illness or Injury  Outcome: Progressing  Intervention: Identify and Manage Fall Risk  Recent Flowsheet Documentation  Taken 3/24/2025 0809 by Katlyn Perez RN  Safety Promotion/Fall Prevention: safety round/check completed  Intervention: Prevent Infection  Recent Flowsheet Documentation  Taken 3/24/2025 0809 by Katlyn Perez RN  Infection Prevention: cohorting utilized  Goal: Optimal Comfort and Wellbeing  Outcome: Progressing  Goal: Readiness for Transition of Care  Outcome: Progressing     Problem: " Cholecystectomy  Goal: Absence of Bleeding  Outcome: Progressing  Goal: Effective Bowel Elimination  Outcome: Progressing  Goal: Fluid and Electrolyte Balance  Outcome: Progressing  Goal: Absence of Infection Signs and Symptoms  Outcome: Progressing  Goal: Anesthesia/Sedation Recovery  Outcome: Progressing  Intervention: Optimize Anesthesia Recovery  Recent Flowsheet Documentation  Taken 3/24/2025 0809 by Katlyn Perez RN  Safety Promotion/Fall Prevention: safety round/check completed  Goal: Pain Control and Function  Outcome: Progressing  Goal: Nausea and Vomiting Relief  Outcome: Progressing  Intervention: Prevent or Manage Nausea and Vomiting  Recent Flowsheet Documentation  Taken 3/24/2025 0809 by Katlyn Perez RN  Nausea/Vomiting Interventions: antiemetic  Goal: Effective Urinary Elimination  Outcome: Progressing  Goal: Effective Oxygenation and Ventilation  Outcome: Progressing

## 2025-03-24 NOTE — TELEPHONE ENCOUNTER
Referral received via [employer] on 3/24/25.    Referred by Pamela Sandoval PA-C for splenic artery aneurysm    Previous imaging completed (pertinent to referral):  See EPIC    Routing to scheduling to coordinate the following:    NEW VASCULAR PATIENT consult with Interventional Radiology  Please schedule this at next available    Appt note:  Referred by Pamela Sandoval PA-C for splenic artery aneurysm    Lexi PINO, RN    [facility] [place]  Office: [phone]  Fax: [phone]

## 2025-03-24 NOTE — DISCHARGE SUMMARY
[facility]  Hospitalist Discharge Summary      Date of Admission:  3/23/2025  Date of Discharge:  3/24/2025  Discharging Provider: Vicki Sanchez  Discharge Service: Hospitalist Service    Discharge Diagnoses     Cholelithiasis concerning for acute cholecystitis  Multiple splenic artery aneurysms  Left ovarian cyst  Hyperglycemia        Clinically Significant Risk Factors          Follow-ups Needed After Discharge   Follow-up Appointments       Hospital Follow-up with Existing Primary Care Provider (PCP)      Please see details below         Schedule Primary Care visit within: 7 Days   Recommended labs and Imaging (to be ordered by Primary Care Provider): BMP, CBC               Unresulted Labs Ordered in the Past 30 Days of this Admission       No orders found for last 31 day(s).            Discharge Disposition   Discharged to home  Condition at discharge: Stable    Hospital Course   Julia Jefferson is a 55 year old female admitted on 3/23/2025. She has no significant past medical history and presents with acute onset of right upper quadrant pain and has findings suspicious for acute cholecystitis.     Upon arrival to the ED, patient was afebrile, /85, pulse 77, and respiratory rate 18. Calcium 10.5, otherwise BMP unremarkable. WBC 11.3, otherwise CBC unremarkable. No concern for cystitis on UA. Sinus rhythm on EKG. CT revealed possible acute calculus cholecystitis, several saccular nonthrombosed splenic artery aneurysms - largest being 2.1 cm, and 4 cm left ovarian cyst. Abdominal US shows cholelithiasis and concerning for acute cholecystitis and small angiomyolipoma of the right kidney. Ertapenem started in the ED to cover for acute cholecystitis. Patient was also given ondansetron and IV hydromorphone in the ED.    Surgery was consulted 3/24 and scheduled elective cholecystomy 3/24. Due to incidental splenic artery aneurysms, follow-up outpatient with vascular surgery at [facility].  Recommended outpatient pelvic US in 6-12 months to monitor left ovarian cyst.      Upon discharge, patient was well and felt comfortable with discharge with her partner after surgery. She denied continued nausea, vomiting.     Consultations This Hospital Stay   SURGERY GENERAL IP CONSULT    Code Status   Full Code    Time Spent on this Encounter   I, Vicki Sanchez, personally saw the patient today and spent greater than 30 minutes discharging this patient.     I was present with the student who participated in the service and in the documentation of the note.  I have verified the history and personally performed the physical exam and plan of care as documented in the note.    Pamela Sandoval PA-C on 3/25/2025 at 7:36 AM      [facility]   [facility] PRE[facility]/POSTOP  [address] [place] [address]  Phone: [phone]  Fax: [phone]  ______________________________________________________________________    Physical Exam   Vital Signs: Temp: 98.2  F (36.8  C) Temp src: Oral BP: (!) 144/80 Pulse: 69   Resp: 14 SpO2: 95 % O2 Device: None (Room air)    Weight: 134 lbs 14.4 oz    General Appearance: A&Ox3, calm and cooperative  HEENT: nontraumatic, normocephalic. Normal external nose and ears. Moist membranes  Respiratory: No accessory muscle use. Lungs clear to auscultation. No wheeze, rales, rhonchi.    Cardiovascular: RRR. S1 and S2 auscultated. No murmurs.   GI: Soft, non tender to palpation. Active bowel sounds.   Skin: No rashes noted.   Other: No LE edema.       Primary Care Physician   [facility]    Discharge Orders      Vascular Surgery Referral      Reason for your hospital stay    You were admitted for a gallstone and had your gallbladder removed. There was concern for an infection related to your gallbladder and you were given antibiotics for this during your hospital stay.     On imaging, there were splenic artery aneurysms found. This will need  outpatient follow-up for monitoring.   Additionally, there was a left ovarian cyst found on imaging.This will need repeat imaging in 6-12 months.     Activity    Your activity upon discharge: activity as tolerated     Diet    Follow this diet upon discharge: Low fat for a week, advance as tolerated.     Hospital Follow-up with Existing Primary Care Provider (PCP)    Please see details below            Significant Results and Procedures   Results for orders placed or performed during the hospital encounter of 03/23/25   CT Abdomen Pelvis w Contrast    Narrative    EXAM: CT ABDOMEN PELVIS W CONTRAST  LOCATION: [facility]  DATE: 3/24/2025    INDICATION: abd pain  COMPARISON: None.  TECHNIQUE: CT scan of the abdomen and pelvis was performed following injection of IV contrast. Multiplanar reformats were obtained. Dose reduction techniques were used.  CONTRAST: 80 mL Isovue 370    FINDINGS:   LOWER CHEST: Normal.    HEPATOBILIARY: Cholelithiasis in a distended gallbladder with mild gallbladder wall thickening and possible faint pericholecystic inflammation about the gallbladder neck. If pain is localized to the right upper quadrant, recommend right upper quadrant   ultrasound. Normal liver. No intrahepatic bile duct or CBD dilatation. No radiodense choledocholithiasis.    PANCREAS: Normal.    SPLEEN: Benign appearing nearly 2 cm splenic lesion in the anterior spleen, probably a hemangioma.    ADRENAL GLANDS: Normal.    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 No follow-up needed. Nonobstructing 3 mm calculus in the right lower pole.    BOWEL: Small hiatal hernia with mild circumferential wall thickening the lower esophagus, possibly from vomiting or GERD. No bowel distention. Normal appendix. Normal colon and rectum. No intraperitoneal fluid.    LYMPH NODES: Normal.    VASCULATURE: Two saccular type nonthrombosed splenic  artery aneurysm; 2.1 cm from the proximal splenic artery and 1.5 cm in the distal splenic artery near splenic clau.    PELVIC ORGANS: Left ovarian cyst measuring roughly 3.6 cm. Small locules of gas in the urinary bladder may be from straight catheterization, but can be correlated with urinalysis to exclude bladder infection which can produce similar findings.    MUSCULOSKELETAL: No acute or aggressive osseous abnormalities.      Impression    IMPRESSION:   1.  Possible acute calculus cholecystitis. Consider right upper quadrant ultrasound and is localized to the right upper quadrant.  2.  Several saccular nonthrombosed splenic artery aneurysm, the largest measuring 2.1 cm. Typically when measured over 2 cm endovascular coil embolization treatment should be considered. If there is available outside hospital imaging to assess the   chronicity/stability of this finding, that would be important data point. If not available, recommend referral to either vascular surgery or interventional radiology for possible treatment.  3.  Nearly 4 cm left ovarian cyst. Recommend follow-up pelvic ultrasound in 6-12 months.   US Abdomen Limited (RUQ)    Narrative    EXAM: US ABDOMEN LIMITED  LOCATION: [facility]  DATE: 3/24/2025    INDICATION: gallstones cholecystitis  COMPARISON: CT abdomen earlier today.  TECHNIQUE: Limited abdominal ultrasound.    FINDINGS:    GALLBLADDER: Gallbladder is distended, contains multiple stones, and has a mildly thickened wall. No significant pericholecystic fluid.    BILE DUCTS: No biliary dilatation. The common duct measures 5 mm.    LIVER: Normal parenchyma with smooth contour. No focal mass. The portal vein is patent with flow in the normal direction.    RIGHT KIDNEY: 1.8 cm circumscribed echogenic mass of upper pole cortex had fat density on prior CT and is consistent with benign angiomyolipoma. No hydronephrosis.    PANCREAS: The visualized portions are normal.    No  ascites.      Impression    IMPRESSION:  1.  Cholelithiasis and findings concerning for acute cholecystitis.  2.  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           Discharge Medications   There are no discharge medications for this patient.    Allergies   No Known Allergies

## 2025-03-24 NOTE — ANESTHESIA POSTPROCEDURE EVALUATION
Patient: Julia Jefferson    Procedure: Procedure(s):  LAPAROSCOPIC CHOLECYSTECTOMY       Anesthesia Type:  General    Note:  Disposition: Inpatient   Postop Pain Control:    PONV: No   Neuro/Psych: Uneventful            Sign Out: Acceptable/Baseline neuro status   Airway/Respiratory: Uneventful            Sign Out: Acceptable/Baseline resp. status   CV/Hemodynamics: Uneventful            Sign Out: Acceptable CV status; No obvious hypovolemia; No obvious fluid overload   Other NRE:    DID A NON-ROUTINE EVENT OCCUR? No           Last vitals:  Vitals Value Taken Time   BP 98/60 03/24/25 1440   Temp 98.24  F (36.8  C) 03/24/25 1441   Pulse 87 03/24/25 1441   Resp 16 03/24/25 1441   SpO2 97 % 03/24/25 1440   Vitals shown include unfiled device data.    Electronically Signed By: Harika Erwin MD  March 24, 2025  2:42 PM

## 2025-03-24 NOTE — H&P
[facility]    History and Physical - Hospitalist Service       Date of Admission:  3/23/2025    Assessment & Plan      Julia Jefferson is a 55 year old female admitted on 3/23/2025. She has no significant past medical history and presents with acute onset of right upper quadrant pain and has findings suspicious for acute cholecystitis.    #Right upper quadrant pain  #Suspected acute cholecystitis  The patient presents with acute onset of right upper quadrant pain after eating a cheeseburger.  On exam her abdomen is nondistended but she does have significant right upper quadrant tenderness and positive Jane sign.  WBC 11.3.  CTAP with possible acute calculus cholecystitis.  RUQ US with cholelithiasis and findings concerning for acute cholecystitis.  -Continue ertapenem started in the ED  -Surgery consult  -IV Dilaudid as needed for pain  -N.p.o.  -Zofran as needed for nausea    #Multiple splenic artery aneurysms  Incidentally noted on CTAP.  There are several saccular notable splenic artery aneurysms the largest measuring 2.1 cm.  Per radiology recommendations typically when measured over 2 cm endovascular coil embolization treatment should be considered.  -Recommend discussing with interventional radiology once the patient is evaluated by surgery for acute cholecystitis    #Left 4 cm ovarian cyst  Another incidental finding on CTAP.  Will need outpatient pelvic ultrasound in 6 to 12 months per radiology.    #Hyperglycemia  Glucose elevated to 168.  Will start very low intensity SSI and obtain A1c.        Diet:  NPO, start MIVF with NS at 100 mL/hour  DVT Prophylaxis: Pneumatic Compression Devices  Rao Catheter: Not present  Lines: None     Cardiac Monitoring: None  Code Status:  Full code, discussed with the patient and her  on admission    Clinically Significant Risk Factors Present on Admission                             # Overweight: Estimated body mass index is 25.51 kg/m  as  "calculated from the following:    Height as of this encounter: 1.676 m (5' 6\").    Weight as of this encounter: 71.7 kg (158 lb 1.1 oz).              Disposition Plan     Medically Ready for Discharge: Anticipated Tomorrow           Ameya Shabazz MD  Hospitalist Service  [facility]  Securely message with [employer] (more info)  Text page via [facility] Paging/Directory     ______________________________________________________________________    Chief Complaint   Right upper quadrant pain    History is obtained from the patient    History of Present Illness   Julia Jefferson is a 55 year old female who has no significant past medical history and presents with right upper quadrant pain.    The patient ate a late lunch yesterday consisting of a cheeseburger and fries.  Later in the evening she developed abdominal bloating, right upper quadrant pain and nausea with nonbilious nonbloody emesis.  She tried Tums and ice cream with no relief.  She describes the pain as burning and sharp and it radiates to her left upper quadrant.  No fever, chest pain, dyspnea, urinary symptoms.    She occasionally drinks alcohol.  She does not use tobacco or illicit drugs.  No marijuana use.    ER course:  -Vital signs normal/stable on admission  -BMP with normal potassium, creatinine and glucose of 168  -LFTs normal  -Lipase 29  -WBC 11.3  -UA not concerning for infection  -CTAP with possible acute calculus cholecystitis, incidental splenic artery aneurysms and left 4 cm ovarian cyst  -Given 1 L IVF, ertapenem, IV Dilaudid      Past Medical History    No past medical history on file.    Past Surgical History   No past surgical history on file.    Prior to Admission Medications   None        Review of Systems    The 10 point Review of Systems is negative other than noted in the HPI or here.     Social History   I have reviewed this patient's social history and updated it with pertinent information if needed.   "       Family History     Mother with peripheral vascular disease      Allergies   No Known Allergies     Physical Exam   Vital Signs: Temp: 97.4  F (36.3  C) Temp src: Temporal BP: 122/85 Pulse: 77   Resp: 18 SpO2: 98 % O2 Device: None (Room air)    Weight: 158 lbs 1.12 oz    GENERAL: Sitting in chair, appears uncomfortable  HEENT: NC/AT, sclera anicteric   CV: RRR, no M/R/G, CR < 2 s   PULM: CTAB, no wheezes, rales, rhonchi   GI: Abd soft, nondistended, significant right upper quadrant tenderness, positive Jane sign, no involuntary or voluntary guarding, no pain when I bumped her chair, not as acute abdomen  MSK: WWP, mild LE edema   NEURO: Awake, alert, oriented to 3/24/2025, CN II-XII grossly intact, MELO, appears nonfocal  SKIN: no rash       Medical Decision Making       65 MINUTES SPENT BY ME on the date of service doing chart review, history, exam, documentation & further activities per the note.      Data     I have personally reviewed the following data over the past 24 hrs:    11.3 (H)  \   12.7   / 224     139 101 11.3 /  168 (H)   3.7 26 0.86 \     ALT: 19 AST: 17 AP: 76 TBILI: 0.9   ALB: 4.6 TOT PROTEIN: 7.4 LIPASE: 29       Imaging results reviewed over the past 24 hrs:   Recent Results (from the past 24 hours)   CT Abdomen Pelvis w Contrast    Narrative    EXAM: CT ABDOMEN PELVIS W CONTRAST  LOCATION: [facility]  DATE: 3/24/2025    INDICATION: abd pain  COMPARISON: None.  TECHNIQUE: CT scan of the abdomen and pelvis was performed following injection of IV contrast. Multiplanar reformats were obtained. Dose reduction techniques were used.  CONTRAST: 80 mL Isovue 370    FINDINGS:   LOWER CHEST: Normal.    HEPATOBILIARY: Cholelithiasis in a distended gallbladder with mild gallbladder wall thickening and possible faint pericholecystic inflammation about the gallbladder neck. If pain is localized to the right upper quadrant, recommend right upper quadrant   ultrasound. Normal liver.  No intrahepatic bile duct or CBD dilatation. No radiodense choledocholithiasis.    PANCREAS: Normal.    SPLEEN: Benign appearing nearly 2 cm splenic lesion in the anterior spleen, probably a hemangioma.    ADRENAL GLANDS: Normal.    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 No follow-up needed. Nonobstructing 3 mm calculus in the right lower pole.    BOWEL: Small hiatal hernia with mild circumferential wall thickening the lower esophagus, possibly from vomiting or GERD. No bowel distention. Normal appendix. Normal colon and rectum. No intraperitoneal fluid.    LYMPH NODES: Normal.    VASCULATURE: Two saccular type nonthrombosed splenic artery aneurysm; 2.1 cm from the proximal splenic artery and 1.5 cm in the distal splenic artery near splenic clau.    PELVIC ORGANS: Left ovarian cyst measuring roughly 3.6 cm. Small locules of gas in the urinary bladder may be from straight catheterization, but can be correlated with urinalysis to exclude bladder infection which can produce similar findings.    MUSCULOSKELETAL: No acute or aggressive osseous abnormalities.      Impression    IMPRESSION:   1.  Possible acute calculus cholecystitis. Consider right upper quadrant ultrasound and is localized to the right upper quadrant.  2.  Several saccular nonthrombosed splenic artery aneurysm, the largest measuring 2.1 cm. Typically when measured over 2 cm endovascular coil embolization treatment should be considered. If there is available outside hospital imaging to assess the   chronicity/stability of this finding, that would be important data point. If not available, recommend referral to either vascular surgery or interventional radiology for possible treatment.  3.  Nearly 4 cm left ovarian cyst. Recommend follow-up pelvic ultrasound in 6-12 months.   US Abdomen Limited (RUQ)    Narrative    EXAM: US ABDOMEN LIMITED  LOCATION: [facility]  DATE: 3/24/2025    INDICATION: gallstones cholecystitis  COMPARISON: CT abdomen earlier today.  TECHNIQUE: Limited abdominal ultrasound.    FINDINGS:    GALLBLADDER: Gallbladder is distended, contains multiple stones, and has a mildly thickened wall. No significant pericholecystic fluid.    BILE DUCTS: No biliary dilatation. The common duct measures 5 mm.    LIVER: Normal parenchyma with smooth contour. No focal mass. The portal vein is patent with flow in the normal direction.    RIGHT KIDNEY: 1.8 cm circumscribed echogenic mass of upper pole cortex had fat density on prior CT and is consistent with benign angiomyolipoma. No hydronephrosis.    PANCREAS: The visualized portions are normal.    No ascites.      Impression    IMPRESSION:  1.  Cholelithiasis and findings concerning for acute cholecystitis.  2.  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

## 2025-03-24 NOTE — PHARMACY-ADMISSION MEDICATION HISTORY
Pharmacist Admission Medication History    Admission medication history is complete. The information provided in this note is only as accurate as the sources available at the time of the update.    Information Source(s): Patient via phone.    Pertinent Information: none    Changes made to PTA medication list:  Added: None  Deleted: None  Changed: None    Medication History Completed By: Madeline Araya RPH 3/24/2025 7:42 AM    No outpatient medications have been marked as taking for the 3/23/25 encounter (Hospital Encounter).

## 2025-03-26 ENCOUNTER — TELEPHONE (OUTPATIENT)
Dept: SURGERY | Facility: CLINIC | Age: 55
End: 2025-03-26
Payer: COMMERCIAL

## 2025-03-26 LAB
PATH REPORT.COMMENTS IMP SPEC: NORMAL
PATH REPORT.COMMENTS IMP SPEC: NORMAL
PATH REPORT.FINAL DX SPEC: NORMAL
PATH REPORT.GROSS SPEC: NORMAL
PATH REPORT.MICROSCOPIC SPEC OTHER STN: NORMAL
PATH REPORT.RELEVANT HX SPEC: NORMAL
PHOTO IMAGE: NORMAL

## 2025-03-26 NOTE — TELEPHONE ENCOUNTER
S/p Lap lucina   Procedure date: 3/23/25  Surgeon: Dr. Mccullough     Patient calling with questions re: dressings and showering.      -Discussed that she is ok to remove gauze dressings. She will have steri strips over the incisions that are to stay in place for the first 2 weeks.  She is ok to shower and pat incisions dry with a clean towel. No need to recover the incision sites.      She is otherwise doing well.  Sore, but doing well with pain.  Discussed low fat diet for 1-2 weeks post-op.      No further needs at this time.

## 2025-04-16 ENCOUNTER — TELEPHONE (OUTPATIENT)
Dept: SURGERY | Facility: CLINIC | Age: 55
End: 2025-04-16
Payer: COMMERCIAL

## 2025-04-16 DIAGNOSIS — Z98.890 POSTOPERATIVE STATE: Primary | ICD-10-CM

## 2025-04-16 NOTE — TELEPHONE ENCOUNTER
[facility]   Postoperative Follow-up Phone Call  -Call to patient to review recent procedure and recovery    Attempted to call patient for post op check.  No answer.  Message was left for patient to call back if they had any questions or concerns.  Not signed up for [employer], unable to send message.  Additional CT findings need review and additional action.  OK to call and discuss with triage RN or myself.    Additional CT findings requiring additional actions:  1.splenic artery aneurysm x2: 2.1cm and 1.5cm.  Per CT report, when over 2cm, vascular surgery or interventional Radiology eval and treatment should be considered.  OK to discuss further with PCP.  2. Left ovarian cyst, 3.6cm; pelvic ultrasound recommended in 6-12 months.  May be arranged via PCP.    Heidy Sewell PA-C

## 2025-04-21 ENCOUNTER — OFFICE VISIT (OUTPATIENT)
Dept: OTHER | Facility: CLINIC | Age: 55
End: 2025-04-21
Attending: PHYSICIAN ASSISTANT
Payer: COMMERCIAL

## 2025-04-21 VITALS — DIASTOLIC BLOOD PRESSURE: 85 MMHG | HEART RATE: 80 BPM | SYSTOLIC BLOOD PRESSURE: 129 MMHG

## 2025-04-21 DIAGNOSIS — I72.8 SPLENIC ARTERY ANEURYSM: ICD-10-CM

## 2025-04-21 PROCEDURE — 99213 OFFICE O/P EST LOW 20 MIN: CPT | Performed by: RADIOLOGY

## 2025-04-21 RX ORDER — LEVOTHYROXINE SODIUM 25 UG/1
25 TABLET ORAL DAILY
COMMUNITY

## 2025-04-21 NOTE — PROGRESS NOTES
[facility]        Patient is here for a consult.    Pt is currently taking no meds that would impact our treatment plan.    /85 (BP Location: Right arm, Patient Position: Chair, Cuff Size: Adult Regular)   Pulse 80   LMP 11/21/2018     The provider has been notified that the patient has no concerns.     Questions patient would like addressed today are: N/A.    Refills are needed: N/A    Has homecare services and agency name:  Nica Cazares MA

## 2025-04-22 DIAGNOSIS — I72.8 SPLENIC ARTERY ANEURYSM: Primary | ICD-10-CM

## 2025-04-22 NOTE — PROGRESS NOTES
VASCULAR OUTPATIENT CONSULT OR VISIT  PHYSICIAN:  Dr. Cuauhtemoc Brooks      LOCATION: [facility]    Julia Jefferson   Medical Record #:  [id number]  YOB: 1970  Age:  55 year old     Date of Service: 4/21/2025    PRIMARY CARE PROVIDER: Clinic, [facility] [place]      HPI:  Julia Jefferson is a 55 year old female who is being referred by Dr. Hector Mccullough for incidental finding of splenic artery aneurysms measuring 2.1 cm and 1.8cm  on CT abdomen pelvis with contrast that was performed on 3/24/2025.  The patient presented to the emergency room with abdominal pain and was known to have acute calculus cholecystitis.  The patient has undergone a laparoscopic cholecystectomy and has recovered from the procedure.    PHH:  No past medical history on file.   No past surgical history on file.    ALLERGIES:  Patient has no known allergies.    MEDS:    Current Outpatient Medications:     acetaminophen (TYLENOL) 325 MG tablet, Take 2 tablets (650 mg) by mouth every 4 hours as needed for mild pain., Disp: 50 tablet, Rfl: 0    calcium carbonate-vitamin D (OSCAL) 500-5 MG-MCG tablet, Take 1 tablet by mouth 2 times daily., Disp: , Rfl:     ibuprofen (ADVIL/MOTRIN) 600 MG tablet, Take 1 tablet (600 mg) by mouth every 6 hours as needed., Disp: 30 tablet, Rfl: 0    levothyroxine (SYNTHROID/LEVOTHROID) 25 MCG tablet, Take 25 mcg by mouth daily., Disp: , Rfl:     oxyCODONE (ROXICODONE) 5 MG tablet, Take 1-2 tablets (5-10 mg) by mouth every 4 hours as needed for moderate to severe pain., Disp: 6 tablet, Rfl: 0    senna-docusate (SENOKOT-S/PERICOLACE) 8.6-50 MG tablet, Take 1-2 tablets by mouth 2 times daily., Disp: 30 tablet, Rfl: 0    SOCIAL HABITS:    History   Smoking Status    Former    Types: Cigarettes   Smokeless Tobacco    Never     Social History    Substance and Sexual Activity      Alcohol use: Yes        Comment: social      History   Drug Use Unknown       FAMILY HISTORY:  No  family history on file.    REVIEW OF SYSTEMS:    A 12 point ROS was reviewed and except for what is listed in the HPI above, all others are negative    PE:  /85 (BP Location: Right arm, Patient Position: Chair, Cuff Size: Adult Regular)   Pulse 80   LMP 11/21/2018   Wt Readings from Last 1 Encounters:   03/24/25 134 lb 14.4 oz (61.2 kg)     There is no height or weight on file to calculate BMI.    EXAM:  GENERAL: This is a well-developed 55 year old female who appears her stated age  EYES: Grossly normal.  MOUTH: Buccal mucosa normal   MUSCULOSKELETAL: Grossly normal and both lower extremities are intact.  HEME/LYMPH: No lymphedema  NEUROLOGIC: Focally intact, Alert and oriented x 3.   PSYCH: appropriate affect  INTEGUMENT: No open lesions or ulcers        DIAGNOSTIC STUDIES:     Images:  US Extremity Non Vascular Bilateral    Result Date: 4/10/2025  COMPARISON: None TECHNIQUE: Limited ultrasound evaluation of the right upper thigh at site of patient reported palpable change. FINDINGS: Size: 3.8 x 2.3 x 0.8 cm (MRI recommended if over 5 cm). Depth: Superficial soft tissues. Echogenicity: Hyperechoic. Margins: Smooth. Vascularity: Nonvascular.    Right upper thigh mass is most likely to represent a lipoma though is technically indeterminate by ultrasound. Recommend clinical follow-up and repeat imaging for increasing size or symptoms at this site.    US Abdomen Limited (RUQ)    Result Date: 3/24/2025  EXAM: US ABDOMEN LIMITED LOCATION: [facility] DATE: 3/24/2025 INDICATION: gallstones cholecystitis COMPARISON: CT abdomen earlier today. TECHNIQUE: Limited abdominal ultrasound. FINDINGS: GALLBLADDER: Gallbladder is distended, contains multiple stones, and has a mildly thickened wall. No significant pericholecystic fluid. BILE DUCTS: No biliary dilatation. The common duct measures 5 mm. LIVER: Normal parenchyma with smooth contour. No focal mass. The portal vein is patent with flow in the  normal direction. RIGHT KIDNEY: 1.8 cm circumscribed echogenic mass of upper pole cortex had fat density on prior CT and is consistent with benign angiomyolipoma. No hydronephrosis. PANCREAS: The visualized portions are normal. No ascites.     IMPRESSION: 1.  Cholelithiasis and findings concerning for acute cholecystitis. 2.  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     CT Abdomen Pelvis w Contrast    Result Date: 3/24/2025  EXAM: CT ABDOMEN PELVIS W CONTRAST LOCATION: [facility] DATE: 3/24/2025 INDICATION: abd pain COMPARISON: None. TECHNIQUE: CT scan of the abdomen and pelvis was performed following injection of IV contrast. Multiplanar reformats were obtained. Dose reduction techniques were used. CONTRAST: 80 mL Isovue 370 FINDINGS: LOWER CHEST: Normal. HEPATOBILIARY: Cholelithiasis in a distended gallbladder with mild gallbladder wall thickening and possible faint pericholecystic inflammation about the gallbladder neck. If pain is localized to the right upper quadrant, recommend right upper quadrant ultrasound. Normal liver. No intrahepatic bile duct or CBD dilatation. No radiodense choledocholithiasis. PANCREAS: Normal. SPLEEN: Benign appearing nearly 2 cm splenic lesion in the anterior spleen, probably a hemangioma. ADRENAL GLANDS: Normal. 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 No follow-up needed. Nonobstructing 3 mm calculus in the right lower pole. BOWEL: Small hiatal hernia with mild circumferential wall thickening the lower esophagus, possibly from vomiting or GERD. No bowel distention. Normal appendix. Normal colon and rectum. No intraperitoneal fluid. LYMPH NODES: Normal. VASCULATURE: Two saccular type nonthrombosed splenic artery aneurysm; 2.1 cm from the proximal splenic artery and 1.5 cm in the distal splenic artery near splenic clau. PELVIC ORGANS: Left ovarian cyst measuring roughly 3.6 cm.  Small locules of gas in the urinary bladder may be from straight catheterization, but can be correlated with urinalysis to exclude bladder infection which can produce similar findings. MUSCULOSKELETAL: No acute or aggressive osseous abnormalities.     IMPRESSION: 1.  Possible acute calculus cholecystitis. Consider right upper quadrant ultrasound and is localized to the right upper quadrant. 2.  Several saccular nonthrombosed splenic artery aneurysm, the largest measuring 2.1 cm. Typically when measured over 2 cm endovascular coil embolization treatment should be considered. If there is available outside hospital imaging to assess the chronicity/stability of this finding, that would be important data point. If not available, recommend referral to either vascular surgery or interventional radiology for possible treatment. 3.  Nearly 4 cm left ovarian cyst. Recommend follow-up pelvic ultrasound in 6-12 months.        LABS:      Sodium   Date Value Ref Range Status   03/24/2025 144 135 - 145 mmol/L Final   03/23/2025 139 135 - 145 mmol/L Final     Urea Nitrogen   Date Value Ref Range Status   03/24/2025 9.0 6.0 - 20.0 mg/dL Final   03/23/2025 11.3 6.0 - 20.0 mg/dL Final     Hemoglobin   Date Value Ref Range Status   03/24/2025 11.8 11.7 - 15.7 g/dL Final   03/23/2025 12.7 11.7 - 15.7 g/dL Final     Platelet Count   Date Value Ref Range Status   03/24/2025 220 150 - 450 10e3/uL Final   03/23/2025 224 150 - 450 10e3/uL Final     INR   Date Value Ref Range Status   03/24/2025 0.99 0.85 - 1.15 Final     Assessment/plan:  This is a pleasant 55-year-old female who presents today to discuss incidental finding of a 2.0cm mid and 1.8cm distal splenic artery aneurysms.  I reviewed with the patient the most recent CT abdomen pelvis that was performed on 3/24/2025.  We discussed the size threshold for treatment of splenic artery aneurysms.  We discussed  endovascular treatment options and risks.     At this time I recommend that  the patient undergo a CTA for further evaluation of the splenic artery aneurysms.  This was a in person visit in which 30 minutes of  total time was spent (either in face-to-face or non-face-to-face time).    Dr. Cuauhtemoc Brooks   Interventional Radiology  Pager# [phone]  [facility]

## 2025-05-10 ENCOUNTER — HOSPITAL ENCOUNTER (OUTPATIENT)
Dept: CT IMAGING | Facility: CLINIC | Age: 55
Discharge: HOME | End: 2025-05-10
Attending: RADIOLOGY | Admitting: RADIOLOGY
Payer: COMMERCIAL

## 2025-05-10 DIAGNOSIS — I72.8 SPLENIC ARTERY ANEURYSM: ICD-10-CM

## 2025-05-10 PROCEDURE — 74174 CTA ABD&PLVS W/CONTRAST: CPT

## 2025-05-10 PROCEDURE — 250N000011 HC RX IP 250 OP 636: Performed by: RADIOLOGY

## 2025-05-10 RX ORDER — IOPAMIDOL 755 MG/ML
72 INJECTION, SOLUTION INTRAVASCULAR ONCE
Status: COMPLETED | OUTPATIENT
Start: 2025-05-10 | End: 2025-05-10

## 2025-05-10 RX ADMIN — IOPAMIDOL 72 ML: 755 INJECTION, SOLUTION INTRAVENOUS at 08:00

## 2025-05-13 ENCOUNTER — RESULTS FOLLOW-UP (OUTPATIENT)
Dept: OTHER | Facility: CLINIC | Age: 55
End: 2025-05-13
Payer: COMMERCIAL

## 2025-05-13 ENCOUNTER — TELEPHONE (OUTPATIENT)
Dept: OTHER | Facility: CLINIC | Age: 55
End: 2025-05-13
Payer: COMMERCIAL

## 2025-05-13 DIAGNOSIS — I72.8 SPLENIC ARTERY ANEURYSM: Primary | ICD-10-CM

## 2025-05-13 NOTE — TELEPHONE ENCOUNTER
Patient called to schedule splenic artery aneurysm embolization with Dr. Brooks  Scheduled for 5/23: check in at 6:30 am at [facility]  The patient will arrange pre-op.  No food after midnight, may have water up until 4 am.  Patient is menopausal.  May need 23 hours observation.  Xena Lee RN  IR nurse clinician  [phone]

## 2025-05-22 ENCOUNTER — TELEPHONE (OUTPATIENT)
Dept: OTHER | Facility: CLINIC | Age: 55
End: 2025-05-22
Payer: COMMERCIAL

## 2025-05-22 DIAGNOSIS — I72.8 SPLENIC ARTERY ANEURYSM: Primary | ICD-10-CM

## 2025-05-22 NOTE — TELEPHONE ENCOUNTER
Per Dr. Brooks patient wants to cancel her procedure.  Will plan for follow up cTA abdomen/pelvis in 6 months.  Xena Lee RN  IR nurse clinician  [phone]

## 2025-05-30 ENCOUNTER — TELEPHONE (OUTPATIENT)
Dept: OTHER | Facility: CLINIC | Age: 55
End: 2025-05-30
Payer: COMMERCIAL

## 2025-05-30 NOTE — TELEPHONE ENCOUNTER
Referral received via [employer] on 5/30/25.    Referred by Cuauhtemoc Brooks MD for Referral to Dr. Rao, patient has history of splenic artery aneurysms. Patient wants work up regarding history of aneurysm     Previous imaging completed (pertinent to referral):  SEE EPIC    Routing to scheduling to coordinate the following:  NEW VASCULAR PATIENT consult with Dr. Rao  Please schedule this at next available      Appt note:  Referred by Cuauhtemoc Brooks MD for Referral to Dr. Rao, patient has history of splenic artery aneurysms. Patient wants work up regarding history of aneurysm

## 2025-06-03 NOTE — TELEPHONE ENCOUNTER
Left voicemail for patient to call back to schedule appointment(s), provided telephone number for patient to call back to schedule.    NEW VASCULAR PATIENT consult with Dr. Rao  Please schedule this at next available        Appt note:  Referred by Cuauhtemoc Brooks MD for Referral to Dr. Rao, patient has history of splenic artery aneurysms. Patient wants work up regarding history of aneurysm

## 2025-06-24 NOTE — PROGRESS NOTES
INITIAL VASCULAR MEDICAL ASSESSMENT  REFERRAL SOURCE: Dr. Brooks  REASON FOR CONSULT: SAAs        EXAM: CTA ABDOMEN PELVIS WITH CONTRAST  LOCATION: [facility]  DATE: 5/10/2025     INDICATION: Splenic artery aneurysm, pre operative planning for splenic artery embolization  COMPARISON: 3/24/2025  TECHNIQUE: CT angiogram abdomen pelvis during arterial phase of injection of IV contrast. 2D and 3D MIP reconstructions were performed by the CT technologist. Dose reduction techniques were used.  CONTRAST: 72mL isovue 370     FINDINGS:  ANGIOGRAM ABDOMEN/PELVIS: Celiac artery patent. Tortuous splenic artery. 1.9 x 1.2 x 1.6 cm saccular aneurysm with wide neck and rim of calcification arising from the proximal splenic artery. 1.8 x 1.4 x 1.3 cm saccular splenic artery aneurysm with rim   of calcification in the upper pole/hilum of the spleen.  8 x 6 x 8 mm saccular aneurysm lower pole/hilum of the spleen. The SMA and CARLO are patent. Single renal arteries with no evidence of renal artery stenosis. Infrarenal abdominal aorta unremarkable. No evidence of abdominal aortic aneurysm. Common iliac,   external iliac and internal iliac arteries are patent bilaterally. Common femoral, proximal profunda femoral and proximal superficial femoral arteries are patent bilaterally.     LOWER CHEST: Normal.     HEPATOBILIARY: Cholecystectomy     PANCREAS: Normal.     SPLEEN: 2 cm low attenuating splenic lesion anterior lower pole. Benign-appearing representing hemangioma or splenic cyst     ADRENAL GLANDS: Normal.     KIDNEYS/BLADDER: Subcentimeter renal cysts. Benign fat-containing 2.3 x 1.6 cm renal angiomyolipoma upper pole right kidney. Nonobstructing 3 mm calculus lower pole right kidney.     BOWEL: Normal.     LYMPH NODES: Normal.     PELVIC ORGANS: 3.5 x 3.1 x 3.2 cm left adnexal cyst.     MUSCULOSKELETAL: Normal.                                                                      IMPRESSION:  1.  Multiple  saccular splenic artery aneurysms, with the largest aneurysm measuring 1.9 cm.  2.  Benign-appearing 2 cm low attenuating splenic lesion representing splenic hemangioma or splenic cyst.  3.  Right upper pole renal angiomyolipoma  4.  Nonobstructing 3 mm calculus lower pole right kidney.  5.  3.5 x 3.1 3.1 x 3.2 cm left adnexal cyst. Recommend 6 month pelvic ultrasound.    HPI: Julia Jefferson is a 55 year old female who is being referred by Dr. Brooks for the incidental finding of non circumferentially calcified saccular splenic artery aneurysms measuring 2.1 cm and 1.8cm  on CT abdomen pelvis with contrast that was performed on 3/24/2025.  The patient presented to the emergency room with abdominal pain and was known to have acute calculus cholecystitis.  The patient has undergone a laparoscopic cholecystectomy and has recovered from the procedure.    CTA actually revealed two additional ANNETTA's not seen on CT.       Review Of Systems  Skin: negative  Eyes: negative  Ears/Nose/Throat: negative  Respiratory: No shortness of breath, dyspnea on exertion, cough, or hemoptysis  Cardiovascular: negative  Gastrointestinal: negative  Genitourinary: negative  Musculoskeletal: negative  Neurologic: negative  Psychiatric: negative  Hematologic/Lymphatic/Immunologic: negative  Endocrine: negative      PAST MEDICAL HISTORY:                No past medical history on file.    PAST SURGICAL HISTORY:                No past surgical history on file.    CURRENT MEDICATIONS:                  Current Outpatient Medications   Medication Sig Dispense Refill    acetaminophen (TYLENOL) 325 MG tablet Take 2 tablets (650 mg) by mouth every 4 hours as needed for mild pain. 50 tablet 0    calcium carbonate-vitamin D (OSCAL) 500-5 MG-MCG tablet Take 1 tablet by mouth 2 times daily.      ibuprofen (ADVIL/MOTRIN) 600 MG tablet Take 1 tablet (600 mg) by mouth every 6 hours as needed. 30 tablet 0    levothyroxine (SYNTHROID/LEVOTHROID) 25 MCG  tablet Take 25 mcg by mouth daily.      oxyCODONE (ROXICODONE) 5 MG tablet Take 1-2 tablets (5-10 mg) by mouth every 4 hours as needed for moderate to severe pain. 6 tablet 0    senna-docusate (SENOKOT-S/PERICOLACE) 8.6-50 MG tablet Take 1-2 tablets by mouth 2 times daily. 30 tablet 0       ALLERGIES:                No Known Allergies    SOCIAL HISTORY:                  Social History     Socioeconomic History    Marital status:      Spouse name: Not on file    Number of children: Not on file    Years of education: Not on file    Highest education level: Not on file   Occupational History    Not on file   Tobacco Use    Smoking status: Former     Types: Cigarettes    Smokeless tobacco: Never   Substance and Sexual Activity    Alcohol use: Yes     Comment: social    Drug use: Never    Sexual activity: Not on file   Other Topics Concern    Not on file   Social History Narrative    Not on file     Social Drivers of Health     Financial Resource Strain: Low Risk  (3/24/2025)    Financial Resource Strain     Within the past 12 months, have you or your family members you live with been unable to get utilities (heat, electricity) when it was really needed?: No   Food Insecurity: No Food Insecurity (4/2/2025)    Received from [employer]    Hunger Vital Sign     Worried About Running Out of Food in the Last Year: Never true     Ran Out of Food in the Last Year: Never true   Transportation Needs: No Transportation Needs (4/2/2025)    Received from [employer]    PRAPARE - Transportation     Lack of Transportation (Medical): No     Lack of Transportation (Non-Medical): No   Physical Activity: Not on file   Stress: Not on file   Social Connections: Not on file   Interpersonal Safety: Low Risk  (3/24/2025)    Interpersonal Safety     Do you feel physically and emotionally safe where you currently live?: Yes     Within the past 12 months, have you been hit, slapped, kicked or otherwise physically hurt by someone?:  No     Within the past 12 months, have you been humiliated or emotionally abused in other ways by your partner or ex-partner?: No   Housing Stability: Low Risk  (4/2/2025)    Received from [employer]    Housing Stability Vital Sign     Unable to Pay for Housing in the Last Year: No     Number of Times Moved in the Last Year: 0     Homeless in the Last Year: No       FAMILY HISTORY:                 No family history on file.      Physical exam Reveals:    O/P: WNL  HEENT: WNL  NECK: No JVD, thyromegaly, or lymphadenopathy  HEART: RRR, no murmurs, gallops, or rubs  LUNGS: CTA bilaterally without rales, wheezes, or rhonchi  GI: NABS, nondistended, nontender, soft  EXT:without cyanosis, clubbing, or edema  NEURO: nonfocal  : no flank tenderness      Rt femoral: 3 plus palpable    Rt popliteal: 3 plus palpable  Rt DP:  3 plus palpable   Rt PT:   3 plus palpable       Lt femoral: 3 plus palpable    Lt popliteal: 3 plus palpable    Lt DP:  3 plus palpable   Lt PT:   3 plus palpable           All relevant labs and imaging reviewed by myself on today's date.     A/P:      (I72.8) Splenic artery aneurysms  (primary encounter diagnosis)    Comment: I advised the patietn she either hs sporadic ANNETTA's, or has them as part of a secondary disorder such as vasculitis NOS, PAN, ANY, FMD. Check the below. RTC two weeks later.     Plan: Echocardiogram Complete, US Lower Extremity         Arterial Duplex Bilateral, CTA Head Neck with         Contrast, Comprehensive metabolic panel,         C-Reactive Protein, High Sensitivity,         Hemoglobin A1c, LipoFit by NMR, Lipoprotein         (a), Anti Nuclear Amaris IgG by IFA with Reflex,         CRP inflammation, Erythrocyte sedimentation         rate auto, Rheumatoid factor, ANCA IgG by IFA         with Reflex to Titer, UA with Microscopic,         Complement C3, Complement C4, Complement         Activity Total (CH50), HIV Antigen Antibody         Combo, Hepatitis B surface antigen,  Hepatitis B        Surface Antibody, Hepatitis B core antibody,         Hepatitis B core antibody IgM, Protein         Immunofixation Serum, Cryoglobulin,         Quantitative with Reflex to Identification            (I87.2) Venous (peripheral) insufficiency    Comment: Knee high Rx compression hosiery Rxd.        79 minutes total medical care on today's date.          The longitudinal care of plan for Julia was addressed during this visit. Due to added complexity of care, we will continue to support Julia Jefferson  and the subsequent management of these conditions and with ongoing continuity of care for these conditions.

## 2025-06-25 ENCOUNTER — OFFICE VISIT (OUTPATIENT)
Dept: OTHER | Facility: CLINIC | Age: 55
End: 2025-06-25
Attending: INTERNAL MEDICINE
Payer: COMMERCIAL

## 2025-06-25 VITALS
HEART RATE: 73 BPM | OXYGEN SATURATION: 98 % | SYSTOLIC BLOOD PRESSURE: 122 MMHG | WEIGHT: 170 LBS | DIASTOLIC BLOOD PRESSURE: 86 MMHG | BODY MASS INDEX: 27.44 KG/M2

## 2025-06-25 DIAGNOSIS — I72.8 SPLENIC ARTERY ANEURYSM: Primary | ICD-10-CM

## 2025-06-25 DIAGNOSIS — I87.2 VENOUS (PERIPHERAL) INSUFFICIENCY: ICD-10-CM

## 2025-06-25 PROBLEM — M25.561 RECURRENT PAIN OF RIGHT KNEE: Status: ACTIVE | Noted: 2017-06-30

## 2025-06-25 PROBLEM — E03.9 HYPOTHYROIDISM: Status: ACTIVE | Noted: 2025-04-03

## 2025-06-25 PROBLEM — J30.2 SEASONAL ALLERGIC RHINITIS: Status: ACTIVE | Noted: 2017-06-30

## 2025-06-25 PROBLEM — N95.0 PMB (POSTMENOPAUSAL BLEEDING): Status: ACTIVE | Noted: 2024-08-19

## 2025-06-25 PROBLEM — E78.1 HYPERTRIGLYCERIDEMIA: Status: ACTIVE | Noted: 2022-07-15

## 2025-06-25 PROCEDURE — 99213 OFFICE O/P EST LOW 20 MIN: CPT | Performed by: INTERNAL MEDICINE

## 2025-06-25 PROCEDURE — 3074F SYST BP LT 130 MM HG: CPT | Performed by: INTERNAL MEDICINE

## 2025-06-25 PROCEDURE — 99205 OFFICE O/P NEW HI 60 MIN: CPT | Performed by: INTERNAL MEDICINE

## 2025-06-25 PROCEDURE — 3079F DIAST BP 80-89 MM HG: CPT | Performed by: INTERNAL MEDICINE

## 2025-06-25 PROCEDURE — G2211 COMPLEX E/M VISIT ADD ON: HCPCS | Performed by: INTERNAL MEDICINE

## 2025-06-25 PROCEDURE — 99417 PROLNG OP E/M EACH 15 MIN: CPT | Performed by: INTERNAL MEDICINE

## 2025-06-25 NOTE — PROGRESS NOTES
[facility]        Patient is here for a consult to discuss history of splenic artery aneurysms.     Pt is currently taking no meds that would impact our treatment plan.    /86 (BP Location: Left arm, Patient Position: Sitting, Cuff Size: Adult Regular)   Pulse 73   Wt 170 lb (77.1 kg)   LMP 11/21/2018   SpO2 98%   BMI 27.44 kg/m      The provider has been notified that the patient has no concerns.     Questions patient would like addressed today are: N/A.    Refills are needed: N/A    Has homecare services and agency name:  Nica Manzanares MA

## 2025-06-26 ENCOUNTER — TELEPHONE (OUTPATIENT)
Dept: OTHER | Facility: CLINIC | Age: 55
End: 2025-06-26
Payer: COMMERCIAL

## 2025-06-26 DIAGNOSIS — I72.8 SPLENIC ARTERY ANEURYSM: Primary | ICD-10-CM

## 2025-06-26 DIAGNOSIS — I87.2 VENOUS (PERIPHERAL) INSUFFICIENCY: ICD-10-CM

## 2025-06-26 NOTE — TELEPHONE ENCOUNTER
Routing to scheduling to coordinate the following:    Non-fasting lab   CTA head neck with contrast   Echocardiogram complete  US LE arterial duplex bilat   Follow in clinic in 2 weeks after imaging and labs are completed  Please schedule labs and imaging within 2 weeks      Appt note: Follow up to 6/25/25    Oliva Merino RN  [facility]  Office: [phone]  Fax: [phone]

## 2025-06-26 NOTE — TELEPHONE ENCOUNTER
Patient states she will be out of town and asked for imaging to be scheduled week of 07/14/25. Patient will coordinate her echocardiogram to be completed in [place] patient has been transferred. Patient asked for visit with Dr Rao to be scheduled 08/11/25 so that she can be seen earlier in the morning. Patient states she did not feel none of this was really urgent and provided the dates she would prefer to be scheduled on.     These are also scheduled according to provider vacation that would not allow for a sooner follow-up visit.      Routing to Dr Rao as LUIS A.

## 2025-07-18 ENCOUNTER — HOSPITAL ENCOUNTER (OUTPATIENT)
Dept: ULTRASOUND IMAGING | Facility: CLINIC | Age: 55
Discharge: HOME | End: 2025-07-18
Attending: INTERNAL MEDICINE | Admitting: INTERNAL MEDICINE
Payer: COMMERCIAL

## 2025-07-18 DIAGNOSIS — I72.8 SPLENIC ARTERY ANEURYSM: ICD-10-CM

## 2025-07-18 PROCEDURE — 93925 LOWER EXTREMITY STUDY: CPT

## 2025-07-19 ENCOUNTER — HOSPITAL ENCOUNTER (OUTPATIENT)
Dept: CT IMAGING | Facility: CLINIC | Age: 55
Discharge: HOME | End: 2025-07-19
Attending: INTERNAL MEDICINE | Admitting: INTERNAL MEDICINE
Payer: COMMERCIAL

## 2025-07-19 DIAGNOSIS — I72.8 SPLENIC ARTERY ANEURYSM: ICD-10-CM

## 2025-07-19 PROCEDURE — 250N000011 HC RX IP 250 OP 636: Performed by: INTERNAL MEDICINE

## 2025-07-19 PROCEDURE — 70496 CT ANGIOGRAPHY HEAD: CPT

## 2025-07-19 RX ORDER — IOPAMIDOL 755 MG/ML
67 INJECTION, SOLUTION INTRAVASCULAR ONCE
Status: COMPLETED | OUTPATIENT
Start: 2025-07-19 | End: 2025-07-19

## 2025-07-19 RX ADMIN — IOPAMIDOL 67 ML: 755 INJECTION, SOLUTION INTRAVENOUS at 09:00

## 2025-08-04 ENCOUNTER — HOSPITAL ENCOUNTER (OUTPATIENT)
Dept: CARDIOLOGY | Facility: CLINIC | Age: 55
Discharge: HOME | End: 2025-08-04
Attending: INTERNAL MEDICINE | Admitting: INTERNAL MEDICINE
Payer: COMMERCIAL

## 2025-08-04 DIAGNOSIS — I72.8 SPLENIC ARTERY ANEURYSM: ICD-10-CM

## 2025-08-04 LAB — LVEF ECHO: NORMAL

## 2025-08-04 PROCEDURE — 93306 TTE W/DOPPLER COMPLETE: CPT

## 2025-08-04 PROCEDURE — 93306 TTE W/DOPPLER COMPLETE: CPT | Mod: 26 | Performed by: INTERNAL MEDICINE

## 2025-08-11 ENCOUNTER — TELEPHONE (OUTPATIENT)
Dept: OTHER | Facility: CLINIC | Age: 55
End: 2025-08-11

## 2025-08-11 ENCOUNTER — OFFICE VISIT (OUTPATIENT)
Dept: OTHER | Facility: CLINIC | Age: 55
End: 2025-08-11
Attending: INTERNAL MEDICINE
Payer: COMMERCIAL

## 2025-08-11 VITALS
BODY MASS INDEX: 27.6 KG/M2 | OXYGEN SATURATION: 99 % | HEART RATE: 85 BPM | WEIGHT: 171 LBS | SYSTOLIC BLOOD PRESSURE: 121 MMHG | DIASTOLIC BLOOD PRESSURE: 84 MMHG

## 2025-08-11 DIAGNOSIS — I87.2 VENOUS (PERIPHERAL) INSUFFICIENCY: ICD-10-CM

## 2025-08-11 DIAGNOSIS — I10 ESSENTIAL HYPERTENSION: ICD-10-CM

## 2025-08-11 DIAGNOSIS — E78.5 HYPERLIPIDEMIA LDL GOAL <70: Primary | ICD-10-CM

## 2025-08-11 DIAGNOSIS — E04.2 MULTINODULAR THYROID: ICD-10-CM

## 2025-08-11 DIAGNOSIS — I72.8 SPLENIC ARTERY ANEURYSM: ICD-10-CM

## 2025-08-11 PROCEDURE — 3074F SYST BP LT 130 MM HG: CPT | Performed by: INTERNAL MEDICINE

## 2025-08-11 PROCEDURE — G2211 COMPLEX E/M VISIT ADD ON: HCPCS | Performed by: INTERNAL MEDICINE

## 2025-08-11 PROCEDURE — 99215 OFFICE O/P EST HI 40 MIN: CPT | Performed by: INTERNAL MEDICINE

## 2025-08-11 PROCEDURE — 3079F DIAST BP 80-89 MM HG: CPT | Performed by: INTERNAL MEDICINE

## 2025-08-11 PROCEDURE — 99213 OFFICE O/P EST LOW 20 MIN: CPT | Performed by: INTERNAL MEDICINE

## 2025-08-11 RX ORDER — ROSUVASTATIN CALCIUM 40 MG/1
40 TABLET, COATED ORAL DAILY
Qty: 90 TABLET | Refills: 3 | Status: SHIPPED | OUTPATIENT
Start: 2025-08-11

## (undated) DEVICE — ESU GROUND PAD ADULT W/CORD E7507

## (undated) DEVICE — Device

## (undated) DEVICE — GLOVE BIOGEL PI MICRO INDICATOR UNDERGLOVE SZ 8.0 48980

## (undated) DEVICE — LINEN FULL SHEET 5511

## (undated) DEVICE — SU VICRYL+ 0 27 UR6 VLT VCP603H

## (undated) DEVICE — ESU ELEC BLADE 2.75" COATED/INSULATED E1455

## (undated) DEVICE — LINEN POUCH DBL 5427

## (undated) DEVICE — LINEN TOWEL PACK X5 5464

## (undated) DEVICE — SUCTION MANIFOLD NEPTUNE 2 SYS 4 PORT 0702-020-000

## (undated) DEVICE — GLOVE BIOGEL PI MICRO SZ 7.5 48575

## (undated) DEVICE — SUCTION IRR STRYKERFLOW II W/TIP 250-070-520

## (undated) DEVICE — SYR 50ML LL W/O NDL 309653

## (undated) DEVICE — ENDO POUCH UNIV RETRIEVAL SYSTEM INZII 10MM CD001

## (undated) DEVICE — ENDO TROCAR SLEEVE KII Z-THREADED 05X100MM CTS02

## (undated) DEVICE — CLIP APPLIER ENDO 5MM M/L LIGAMAX EL5ML

## (undated) DEVICE — ENDO TROCAR BLUNT TIP KII BALLOON 12X100MM C0R47

## (undated) DEVICE — ESU CORD MONOPOLAR 10'  E0510

## (undated) DEVICE — SU VICRYL 4-0 PS-2 18" UND J496H

## (undated) DEVICE — ENDO TROCAR FIRST ENTRY KII FIOS Z-THRD 05X100MM CTF03

## (undated) DEVICE — SURGICEL HEMOSTAT 3X4" NUKNIT 1943

## (undated) DEVICE — ESU PENCIL W/HOLSTER E2350H

## (undated) DEVICE — SOLUTION IV IRRIGATION 0.9% NACL 1000ML R5200-01

## (undated) DEVICE — BLADE KNIFE SURG 11 371111

## (undated) DEVICE — BAG CLEAR TRASH 1.3M 39X33" P4040C

## (undated) DEVICE — LINEN HALF SHEET 5512

## (undated) RX ORDER — PROPOFOL 10 MG/ML
INJECTION, EMULSION INTRAVENOUS
Status: DISPENSED
Start: 2025-03-24

## (undated) RX ORDER — DEXAMETHASONE SODIUM PHOSPHATE 4 MG/ML
INJECTION, SOLUTION INTRA-ARTICULAR; INTRALESIONAL; INTRAMUSCULAR; INTRAVENOUS; SOFT TISSUE
Status: DISPENSED
Start: 2025-03-24

## (undated) RX ORDER — GLYCOPYRROLATE 0.2 MG/ML
INJECTION, SOLUTION INTRAMUSCULAR; INTRAVENOUS
Status: DISPENSED
Start: 2025-03-24

## (undated) RX ORDER — LABETALOL HYDROCHLORIDE 5 MG/ML
INJECTION, SOLUTION INTRAVENOUS
Status: DISPENSED
Start: 2025-03-24

## (undated) RX ORDER — LIDOCAINE HYDROCHLORIDE 10 MG/ML
INJECTION, SOLUTION EPIDURAL; INFILTRATION; INTRACAUDAL; PERINEURAL
Status: DISPENSED
Start: 2025-03-24

## (undated) RX ORDER — INDOCYANINE GREEN AND WATER 25 MG
KIT INJECTION
Status: DISPENSED
Start: 2025-03-24

## (undated) RX ORDER — BUPIVACAINE HYDROCHLORIDE AND EPINEPHRINE 5; 5 MG/ML; UG/ML
INJECTION, SOLUTION EPIDURAL; INTRACAUDAL; PERINEURAL
Status: DISPENSED
Start: 2025-03-24

## (undated) RX ORDER — FENTANYL CITRATE 50 UG/ML
INJECTION, SOLUTION INTRAMUSCULAR; INTRAVENOUS
Status: DISPENSED
Start: 2025-03-24

## (undated) RX ORDER — ONDANSETRON 2 MG/ML
INJECTION INTRAMUSCULAR; INTRAVENOUS
Status: DISPENSED
Start: 2025-03-24